# Patient Record
Sex: MALE | Race: WHITE | Employment: FULL TIME | URBAN - METROPOLITAN AREA
[De-identification: names, ages, dates, MRNs, and addresses within clinical notes are randomized per-mention and may not be internally consistent; named-entity substitution may affect disease eponyms.]

---

## 2017-08-23 ENCOUNTER — TELEPHONE (OUTPATIENT)
Dept: INTERNAL MEDICINE CLINIC | Facility: CLINIC | Age: 62
End: 2017-08-23

## 2017-08-23 NOTE — TELEPHONE ENCOUNTER
Patient scheduled physical for 9/29/17  Patient still has labs ordered for a physical from 9/30/16 that he never completed  These labs are good for a year.     TCB----no hipaa info listed

## 2017-09-23 ENCOUNTER — APPOINTMENT (OUTPATIENT)
Dept: LAB | Age: 62
End: 2017-09-23
Attending: INTERNAL MEDICINE
Payer: COMMERCIAL

## 2017-09-23 PROCEDURE — 80061 LIPID PANEL: CPT | Performed by: INTERNAL MEDICINE

## 2017-09-23 PROCEDURE — 84443 ASSAY THYROID STIM HORMONE: CPT | Performed by: INTERNAL MEDICINE

## 2017-09-23 PROCEDURE — 85025 COMPLETE CBC W/AUTO DIFF WBC: CPT | Performed by: INTERNAL MEDICINE

## 2017-09-23 PROCEDURE — 82306 VITAMIN D 25 HYDROXY: CPT | Performed by: INTERNAL MEDICINE

## 2017-09-23 PROCEDURE — 80053 COMPREHEN METABOLIC PANEL: CPT | Performed by: INTERNAL MEDICINE

## 2017-09-23 PROCEDURE — 81003 URINALYSIS AUTO W/O SCOPE: CPT | Performed by: INTERNAL MEDICINE

## 2017-09-29 ENCOUNTER — OFFICE VISIT (OUTPATIENT)
Dept: INTERNAL MEDICINE CLINIC | Facility: CLINIC | Age: 62
End: 2017-09-29

## 2017-09-29 VITALS
TEMPERATURE: 98 F | WEIGHT: 167 LBS | RESPIRATION RATE: 18 BRPM | HEART RATE: 96 BPM | DIASTOLIC BLOOD PRESSURE: 80 MMHG | SYSTOLIC BLOOD PRESSURE: 110 MMHG | OXYGEN SATURATION: 98 % | HEIGHT: 66 IN | BODY MASS INDEX: 26.84 KG/M2

## 2017-09-29 DIAGNOSIS — E78.01 FAMILIAL HYPERCHOLESTEROLEMIA: ICD-10-CM

## 2017-09-29 DIAGNOSIS — F41.9 ANXIETY: ICD-10-CM

## 2017-09-29 DIAGNOSIS — Z12.11 SCREEN FOR COLON CANCER: ICD-10-CM

## 2017-09-29 DIAGNOSIS — Z91.09 ENVIRONMENTAL ALLERGIES: ICD-10-CM

## 2017-09-29 DIAGNOSIS — Z00.00 PHYSICAL EXAM: Primary | ICD-10-CM

## 2017-09-29 PROCEDURE — 99396 PREV VISIT EST AGE 40-64: CPT | Performed by: INTERNAL MEDICINE

## 2017-09-29 RX ORDER — ALPRAZOLAM 0.25 MG/1
0.25 TABLET ORAL EVERY 6 HOURS PRN
Qty: 40 TABLET | Refills: 1 | Status: SHIPPED | OUTPATIENT
Start: 2017-09-29 | End: 2018-07-19

## 2017-09-29 RX ORDER — LORATADINE 10 MG/1
10 TABLET ORAL DAILY
COMMUNITY

## 2017-09-29 RX ORDER — DIPHENHYDRAMINE HCL 25 MG
25 TABLET ORAL NIGHTLY PRN
COMMUNITY

## 2017-09-29 NOTE — PATIENT INSTRUCTIONS
1. Physical exam  Physical exam instruction: Improve diet and exercise, fasting labs completed and discussed and visit,    2. Environmental allergies  Continue as needed medications, try to avoid the stimulant parts, such as Sudafed    3.  Anxiety  Stable,

## 2018-05-16 PROCEDURE — 88305 TISSUE EXAM BY PATHOLOGIST: CPT | Performed by: INTERNAL MEDICINE

## 2018-07-19 DIAGNOSIS — F41.9 ANXIETY: ICD-10-CM

## 2018-07-19 RX ORDER — ALPRAZOLAM 0.25 MG/1
0.25 TABLET ORAL EVERY 6 HOURS PRN
Qty: 40 TABLET | Refills: 1 | Status: SHIPPED
Start: 2018-07-19 | End: 2021-02-12

## 2021-01-18 ENCOUNTER — TELEPHONE (OUTPATIENT)
Dept: INTERNAL MEDICINE CLINIC | Facility: CLINIC | Age: 66
End: 2021-01-18

## 2021-01-18 DIAGNOSIS — Z12.5 SCREENING FOR PROSTATE CANCER: ICD-10-CM

## 2021-01-18 DIAGNOSIS — E55.9 VITAMIN D DEFICIENCY: ICD-10-CM

## 2021-01-18 DIAGNOSIS — Z00.00 ANNUAL PHYSICAL EXAM: Primary | ICD-10-CM

## 2021-01-18 NOTE — TELEPHONE ENCOUNTER
Patient calling for annual labs to be entered. Has physical scheduled with Dr. Victor Hugo Garcia 2/23/2021.   Would like PSA included in orders    Best call back number [phone]

## 2021-01-19 NOTE — TELEPHONE ENCOUNTER
Spoke to patient and relayed MD message, patient verbalized understanding. Patient transferred to [facility] at [address] to schedule lab visit.

## 2021-01-27 NOTE — TELEPHONE ENCOUNTER
Patient is calling today to request a cologuard test be added to his chart. Patient is wondering how the cologuard test works and what exactly the steps are to complete it.       # [phone]  Patient is coming in to see Dr Nessa Escobar on 2/12/2021 at [address] [place] [address].

## 2021-01-27 NOTE — TELEPHONE ENCOUNTER
Called patient and explained how cologuard test works and that he can discuss at time of visit -verbalized understanding

## 2021-02-09 ENCOUNTER — LAB ENCOUNTER (OUTPATIENT)
Dept: LAB | Age: 66
End: 2021-02-09
Attending: INTERNAL MEDICINE
Payer: COMMERCIAL

## 2021-02-09 DIAGNOSIS — Z00.00 ANNUAL PHYSICAL EXAM: ICD-10-CM

## 2021-02-09 LAB
ALBUMIN SERPL-MCNC: 3.6 G/DL (ref 3.4–5)
ALBUMIN/GLOB SERPL: 1 {RATIO} (ref 1–2)
ALP LIVER SERPL-CCNC: 87 U/L
ALT SERPL-CCNC: 36 U/L
ANION GAP SERPL CALC-SCNC: 6 MMOL/L (ref 0–18)
AST SERPL-CCNC: 21 U/L (ref 15–37)
BACTERIA UR QL AUTO: NEGATIVE /HPF
BASOPHILS # BLD AUTO: 0.05 X10(3) UL (ref 0–0.2)
BASOPHILS NFR BLD AUTO: 0.9 %
BILIRUB SERPL-MCNC: 0.6 MG/DL (ref 0.1–2)
BILIRUB UR QL: NEGATIVE
BUN BLD-MCNC: 17 MG/DL (ref 7–18)
BUN/CREAT SERPL: 15.6 (ref 10–20)
CALCIUM BLD-MCNC: 8.8 MG/DL (ref 8.5–10.1)
CHLORIDE SERPL-SCNC: 108 MMOL/L (ref 98–112)
CHOLEST SMN-MCNC: 238 MG/DL (ref ?–200)
CLARITY UR: CLEAR
CO2 SERPL-SCNC: 27 MMOL/L (ref 21–32)
COLOR UR: YELLOW
COMPLEXED PSA SERPL-MCNC: 0.71 NG/ML (ref ?–4)
CREAT BLD-MCNC: 1.09 MG/DL
DEPRECATED RDW RBC AUTO: 38.5 FL (ref 35.1–46.3)
EOSINOPHIL # BLD AUTO: 0.17 X10(3) UL (ref 0–0.7)
EOSINOPHIL NFR BLD AUTO: 3.2 %
ERYTHROCYTE [DISTWIDTH] IN BLOOD BY AUTOMATED COUNT: 11.6 % (ref 11–15)
GLOBULIN PLAS-MCNC: 3.7 G/DL (ref 2.8–4.4)
GLUCOSE BLD-MCNC: 107 MG/DL (ref 70–99)
GLUCOSE UR-MCNC: NEGATIVE MG/DL
HCT VFR BLD AUTO: 45.4 %
HDLC SERPL-MCNC: 49 MG/DL (ref 40–59)
HGB BLD-MCNC: 15.8 G/DL
HGB UR QL STRIP.AUTO: NEGATIVE
IMM GRANULOCYTES # BLD AUTO: 0.01 X10(3) UL (ref 0–1)
IMM GRANULOCYTES NFR BLD: 0.2 %
KETONES UR-MCNC: NEGATIVE MG/DL
LDLC SERPL CALC-MCNC: 169 MG/DL (ref ?–100)
LEUKOCYTE ESTERASE UR QL STRIP.AUTO: NEGATIVE
LYMPHOCYTES # BLD AUTO: 1.62 X10(3) UL (ref 1–4)
LYMPHOCYTES NFR BLD AUTO: 30.7 %
M PROTEIN MFR SERPL ELPH: 7.3 G/DL (ref 6.4–8.2)
MCH RBC QN AUTO: 31.8 PG (ref 26–34)
MCHC RBC AUTO-ENTMCNC: 34.8 G/DL (ref 31–37)
MCV RBC AUTO: 91.3 FL
MONOCYTES # BLD AUTO: 0.6 X10(3) UL (ref 0.1–1)
MONOCYTES NFR BLD AUTO: 11.4 %
NEUTROPHILS # BLD AUTO: 2.82 X10 (3) UL (ref 1.5–7.7)
NEUTROPHILS # BLD AUTO: 2.82 X10(3) UL (ref 1.5–7.7)
NEUTROPHILS NFR BLD AUTO: 53.6 %
NITRITE UR QL STRIP.AUTO: NEGATIVE
NONHDLC SERPL-MCNC: 189 MG/DL (ref ?–130)
OSMOLALITY SERPL CALC.SUM OF ELEC: 294 MOSM/KG (ref 275–295)
PATIENT FASTING Y/N/NP: YES
PATIENT FASTING Y/N/NP: YES
PH UR: 6 [PH] (ref 5–8)
PLATELET # BLD AUTO: 327 10(3)UL (ref 150–450)
POTASSIUM SERPL-SCNC: 4.1 MMOL/L (ref 3.5–5.1)
PROT UR-MCNC: NEGATIVE MG/DL
RBC # BLD AUTO: 4.97 X10(6)UL
RBC #/AREA URNS AUTO: 0 /HPF
SODIUM SERPL-SCNC: 141 MMOL/L (ref 136–145)
SP GR UR STRIP: 1.01 (ref 1–1.03)
T4 FREE SERPL-MCNC: 1.1 NG/DL (ref 0.8–1.7)
TRIGL SERPL-MCNC: 98 MG/DL (ref 30–149)
TSI SER-ACNC: 3.8 MIU/ML (ref 0.36–3.74)
UROBILINOGEN UR STRIP-ACNC: <2
VLDLC SERPL CALC-MCNC: 20 MG/DL (ref 0–30)
WBC # BLD AUTO: 5.3 X10(3) UL (ref 4–11)
WBC #/AREA URNS AUTO: 1 /HPF

## 2021-02-09 PROCEDURE — 80061 LIPID PANEL: CPT | Performed by: INTERNAL MEDICINE

## 2021-02-09 PROCEDURE — 84403 ASSAY OF TOTAL TESTOSTERONE: CPT

## 2021-02-09 PROCEDURE — 84443 ASSAY THYROID STIM HORMONE: CPT | Performed by: INTERNAL MEDICINE

## 2021-02-09 PROCEDURE — 81001 URINALYSIS AUTO W/SCOPE: CPT | Performed by: INTERNAL MEDICINE

## 2021-02-09 PROCEDURE — 80053 COMPREHEN METABOLIC PANEL: CPT | Performed by: INTERNAL MEDICINE

## 2021-02-09 PROCEDURE — 36415 COLL VENOUS BLD VENIPUNCTURE: CPT | Performed by: INTERNAL MEDICINE

## 2021-02-09 PROCEDURE — 85025 COMPLETE CBC W/AUTO DIFF WBC: CPT | Performed by: INTERNAL MEDICINE

## 2021-02-09 PROCEDURE — 84402 ASSAY OF FREE TESTOSTERONE: CPT

## 2021-02-09 PROCEDURE — 84439 ASSAY OF FREE THYROXINE: CPT | Performed by: INTERNAL MEDICINE

## 2021-02-09 PROCEDURE — 82306 VITAMIN D 25 HYDROXY: CPT | Performed by: INTERNAL MEDICINE

## 2021-02-10 LAB — 25(OH)D3 SERPL-MCNC: 46.8 NG/ML (ref 30–100)

## 2021-02-11 LAB
TESTOSTERONE, FREE, S: 11.6 NG/DL
TESTOSTERONE, TOTAL, S: 463 NG/DL

## 2021-02-12 ENCOUNTER — OFFICE VISIT (OUTPATIENT)
Dept: INTERNAL MEDICINE CLINIC | Facility: CLINIC | Age: 66
End: 2021-02-12
Payer: COMMERCIAL

## 2021-02-12 VITALS
DIASTOLIC BLOOD PRESSURE: 78 MMHG | HEIGHT: 66 IN | BODY MASS INDEX: 26.2 KG/M2 | WEIGHT: 163 LBS | OXYGEN SATURATION: 98 % | HEART RATE: 89 BPM | SYSTOLIC BLOOD PRESSURE: 112 MMHG | TEMPERATURE: 98 F

## 2021-02-12 DIAGNOSIS — E78.00 PURE HYPERCHOLESTEROLEMIA: ICD-10-CM

## 2021-02-12 DIAGNOSIS — Z00.00 PHYSICAL EXAM: Primary | ICD-10-CM

## 2021-02-12 DIAGNOSIS — Z86.79 HISTORY OF CARDIAC MURMUR: ICD-10-CM

## 2021-02-12 DIAGNOSIS — F41.9 ANXIETY: ICD-10-CM

## 2021-02-12 PROCEDURE — 3078F DIAST BP <80 MM HG: CPT | Performed by: INTERNAL MEDICINE

## 2021-02-12 PROCEDURE — 3074F SYST BP LT 130 MM HG: CPT | Performed by: INTERNAL MEDICINE

## 2021-02-12 PROCEDURE — 3008F BODY MASS INDEX DOCD: CPT | Performed by: INTERNAL MEDICINE

## 2021-02-12 PROCEDURE — 99387 INIT PM E/M NEW PAT 65+ YRS: CPT | Performed by: INTERNAL MEDICINE

## 2021-02-12 PROCEDURE — 93000 ELECTROCARDIOGRAM COMPLETE: CPT | Performed by: INTERNAL MEDICINE

## 2021-02-12 RX ORDER — ALPRAZOLAM 0.25 MG/1
0.25 TABLET ORAL EVERY 6 HOURS PRN
Qty: 40 TABLET | Refills: 1 | Status: SHIPPED | OUTPATIENT
Start: 2021-02-12 | End: 2021-06-25

## 2021-02-12 NOTE — PROGRESS NOTES
Geoff Mendes is a 77year old male who presents for a complete physical exam.   HPI:   Pt complains of:    Patient presents with:  Physical: pt here for annual exam, diet is lean and stable, gluten free. exercise 2 times per week.  45 min of hr at 65%  Hy depression. Integumentary: Negative for Hives and rash. MS: Negative muscle weakness. negative for joint pain  Hema/Lymph: Negative Easy bleeding and easy bruising. Allergic/Immuno: Negative Environmental allergies and food allergies.     EXAM:   / we discussed, replace protein, we discussed Isopure after the workouts.   Lets get the calcium scoring test done, call the central scheduling department try to set this up I do recommend we get it done in the next few weeks, lets recheck the cholesterol cou

## 2021-02-12 NOTE — PATIENT INSTRUCTIONS
1. Physical exam  Physical exam instruction: Improve diet and exercise, complete fasting labs in the near future and you will be called with results 5-7 days after completed, call with questions.   Call the central scheduling number at [phone] to sched

## 2021-06-23 ENCOUNTER — TELEPHONE (OUTPATIENT)
Dept: INTERNAL MEDICINE CLINIC | Facility: CLINIC | Age: 66
End: 2021-06-23

## 2021-06-23 DIAGNOSIS — F41.9 ANXIETY: ICD-10-CM

## 2021-06-24 NOTE — TELEPHONE ENCOUNTER
To MD:  The above refill request is for a controlled substance. Please review pended medication order. Print and sign for staff to fax to pharmacy or prescribe electronically.     Last office visit: 2/12/2021  Last time refill sent and quantity/refills:

## 2021-06-25 RX ORDER — ALPRAZOLAM 0.25 MG/1
0.25 TABLET ORAL EVERY 6 HOURS PRN
Qty: 40 TABLET | Refills: 1 | Status: SHIPPED | OUTPATIENT
Start: 2021-06-25 | End: 2021-08-27

## 2021-08-21 ENCOUNTER — LAB ENCOUNTER (OUTPATIENT)
Dept: LAB | Age: 66
End: 2021-08-21
Attending: INTERNAL MEDICINE
Payer: COMMERCIAL

## 2021-08-21 DIAGNOSIS — Z00.00 PHYSICAL EXAM: ICD-10-CM

## 2021-08-21 LAB
ALBUMIN SERPL-MCNC: 3.6 G/DL (ref 3.4–5)
ALBUMIN/GLOB SERPL: 1.1 {RATIO} (ref 1–2)
ALP LIVER SERPL-CCNC: 98 U/L
ALT SERPL-CCNC: 33 U/L
ANION GAP SERPL CALC-SCNC: 5 MMOL/L (ref 0–18)
AST SERPL-CCNC: 25 U/L (ref 15–37)
BILIRUB SERPL-MCNC: 0.5 MG/DL (ref 0.1–2)
BUN BLD-MCNC: 18 MG/DL (ref 7–18)
BUN/CREAT SERPL: 20 (ref 10–20)
CALCIUM BLD-MCNC: 8.4 MG/DL (ref 8.5–10.1)
CHLORIDE SERPL-SCNC: 110 MMOL/L (ref 98–112)
CHOLEST SMN-MCNC: 187 MG/DL (ref ?–200)
CO2 SERPL-SCNC: 25 MMOL/L (ref 21–32)
CREAT BLD-MCNC: 0.9 MG/DL
GLOBULIN PLAS-MCNC: 3.4 G/DL (ref 2.8–4.4)
GLUCOSE BLD-MCNC: 103 MG/DL (ref 70–99)
HDLC SERPL-MCNC: 40 MG/DL (ref 40–59)
LDLC SERPL CALC-MCNC: 130 MG/DL (ref ?–100)
M PROTEIN MFR SERPL ELPH: 7 G/DL (ref 6.4–8.2)
NONHDLC SERPL-MCNC: 147 MG/DL (ref ?–130)
OSMOLALITY SERPL CALC.SUM OF ELEC: 292 MOSM/KG (ref 275–295)
PATIENT FASTING Y/N/NP: YES
PATIENT FASTING Y/N/NP: YES
POTASSIUM SERPL-SCNC: 3.8 MMOL/L (ref 3.5–5.1)
SODIUM SERPL-SCNC: 140 MMOL/L (ref 136–145)
TRIGL SERPL-MCNC: 93 MG/DL (ref 30–149)
VLDLC SERPL CALC-MCNC: 17 MG/DL (ref 0–30)

## 2021-08-21 PROCEDURE — 80053 COMPREHEN METABOLIC PANEL: CPT

## 2021-08-21 PROCEDURE — 36415 COLL VENOUS BLD VENIPUNCTURE: CPT

## 2021-08-21 PROCEDURE — 80061 LIPID PANEL: CPT

## 2021-08-27 ENCOUNTER — OFFICE VISIT (OUTPATIENT)
Dept: INTERNAL MEDICINE CLINIC | Facility: CLINIC | Age: 66
End: 2021-08-27
Payer: COMMERCIAL

## 2021-08-27 VITALS
HEIGHT: 66 IN | DIASTOLIC BLOOD PRESSURE: 60 MMHG | OXYGEN SATURATION: 98 % | HEART RATE: 82 BPM | SYSTOLIC BLOOD PRESSURE: 124 MMHG | TEMPERATURE: 98 F | BODY MASS INDEX: 26.2 KG/M2 | WEIGHT: 163 LBS

## 2021-08-27 DIAGNOSIS — E78.00 PURE HYPERCHOLESTEROLEMIA: Primary | ICD-10-CM

## 2021-08-27 DIAGNOSIS — F41.9 ANXIETY: ICD-10-CM

## 2021-08-27 PROCEDURE — 3008F BODY MASS INDEX DOCD: CPT | Performed by: INTERNAL MEDICINE

## 2021-08-27 PROCEDURE — 99214 OFFICE O/P EST MOD 30 MIN: CPT | Performed by: INTERNAL MEDICINE

## 2021-08-27 PROCEDURE — 3078F DIAST BP <80 MM HG: CPT | Performed by: INTERNAL MEDICINE

## 2021-08-27 PROCEDURE — 3074F SYST BP LT 130 MM HG: CPT | Performed by: INTERNAL MEDICINE

## 2021-08-27 RX ORDER — ALPRAZOLAM 0.25 MG/1
0.25 TABLET ORAL EVERY 6 HOURS PRN
Qty: 40 TABLET | Refills: 1 | Status: SHIPPED | OUTPATIENT
Start: 2021-08-27 | End: 2021-10-05

## 2021-08-27 NOTE — PROGRESS NOTES
HPI:   Malik Levi is a 74year old male who presents for complains of: Patient presents with:   Follow - Up: Pt here for cholesterol f/u, much improved with diet and exrcise, fish oil  Anxiety: stressed with daughter with daughter with new diagnosis of b mg total) by mouth every 6 (six) hours as needed for Anxiety. Dispense: 40 tablet;  Refill: 1  - BH NAVIGATOR        Spent 30 minutes obtaining history, evaluating patient, discussing treatment options, diet, exercise, review of available labs and radiolog

## 2021-08-27 NOTE — PATIENT INSTRUCTIONS
1. Pure hypercholesterolemia  Stable, keep up the good work, nice job reducing levels, lets continue to observe this, then in 6 to 12 months lets repeat your fasting lab work with a visit here for your regular physical,  I like the idea of you changing up

## 2021-10-04 ENCOUNTER — TELEPHONE (OUTPATIENT)
Dept: INTERNAL MEDICINE CLINIC | Facility: CLINIC | Age: 66
End: 2021-10-04

## 2021-10-04 DIAGNOSIS — F41.9 ANXIETY: ICD-10-CM

## 2021-10-04 NOTE — TELEPHONE ENCOUNTER
Pt. Is calling to request a refill on Alprazolam pt. Is out of meds   Ph. # [phone]  [facility].  # [phone]  Routed low to Rx

## 2021-10-05 RX ORDER — ALPRAZOLAM 0.25 MG/1
0.25 TABLET ORAL EVERY 6 HOURS PRN
Qty: 40 TABLET | Refills: 1 | Status: SHIPPED | OUTPATIENT
Start: 2021-10-05

## 2021-10-05 NOTE — TELEPHONE ENCOUNTER
To MD:  The above refill request is for a controlled substance. Please review pended medication order. Print and sign for staff to fax to pharmacy or prescribe electronically.     Last office visit: 8/27/21  Last time refill sent and quantity/refills: 8/

## 2022-02-09 ENCOUNTER — TELEPHONE (OUTPATIENT)
Dept: INTERNAL MEDICINE CLINIC | Facility: CLINIC | Age: 67
End: 2022-02-09

## 2022-02-10 RX ORDER — ALPRAZOLAM 0.25 MG/1
0.25 TABLET ORAL EVERY 6 HOURS PRN
Qty: 40 TABLET | Refills: 1 | Status: SHIPPED | OUTPATIENT
Start: 2022-02-10

## 2022-04-08 ENCOUNTER — HOSPITAL ENCOUNTER (OUTPATIENT)
Dept: REHABILITATION | Age: 67
Discharge: STILL ADMITTED OR ADMINISTRATIVE | End: 2022-04-08

## 2022-04-08 DIAGNOSIS — M25.511 RIGHT SHOULDER PAIN: Primary | ICD-10-CM

## 2022-04-08 PROCEDURE — 97110 THERAPEUTIC EXERCISES: CPT | Performed by: PHYSICAL THERAPIST

## 2022-04-08 PROCEDURE — 97161 PT EVAL LOW COMPLEX 20 MIN: CPT | Performed by: PHYSICAL THERAPIST

## 2022-04-08 PROCEDURE — 97140 MANUAL THERAPY 1/> REGIONS: CPT | Performed by: PHYSICAL THERAPIST

## 2022-04-23 ENCOUNTER — HOSPITAL ENCOUNTER (OUTPATIENT)
Dept: REHABILITATION | Age: 67
Discharge: STILL ADMITTED OR ADMINISTRATIVE | End: 2022-04-23

## 2022-04-23 PROCEDURE — 97140 MANUAL THERAPY 1/> REGIONS: CPT | Performed by: PHYSICAL THERAPIST

## 2022-04-23 PROCEDURE — 97110 THERAPEUTIC EXERCISES: CPT | Performed by: PHYSICAL THERAPIST

## 2022-04-29 ENCOUNTER — HOSPITAL ENCOUNTER (OUTPATIENT)
Dept: REHABILITATION | Age: 67
Discharge: STILL ADMITTED OR ADMINISTRATIVE | End: 2022-04-29

## 2022-04-29 PROCEDURE — 97140 MANUAL THERAPY 1/> REGIONS: CPT | Performed by: PHYSICAL THERAPIST

## 2022-04-29 PROCEDURE — 97110 THERAPEUTIC EXERCISES: CPT | Performed by: PHYSICAL THERAPIST

## 2022-05-04 ENCOUNTER — TELEPHONE (OUTPATIENT)
Dept: INTERNAL MEDICINE CLINIC | Facility: CLINIC | Age: 67
End: 2022-05-04

## 2022-05-05 RX ORDER — ALPRAZOLAM 0.25 MG/1
0.25 TABLET ORAL EVERY 6 HOURS PRN
Qty: 40 TABLET | Refills: 1 | Status: SHIPPED | OUTPATIENT
Start: 2022-05-05

## 2022-05-05 NOTE — TELEPHONE ENCOUNTER
To MD:  The above refill request is for a controlled substance. Please review pended medication order. Print and sign for staff to fax to pharmacy or prescribe electronically.     Last office visit: 8/27/2021  Last time refill sent and quantity/refills: 2/10/2022 #40 with 1 refill

## 2022-05-07 ENCOUNTER — HOSPITAL ENCOUNTER (OUTPATIENT)
Dept: REHABILITATION | Age: 67
Discharge: STILL ADMITTED OR ADMINISTRATIVE | End: 2022-05-07

## 2022-05-07 PROCEDURE — 97140 MANUAL THERAPY 1/> REGIONS: CPT | Performed by: PHYSICAL THERAPIST

## 2022-05-07 PROCEDURE — 97110 THERAPEUTIC EXERCISES: CPT | Performed by: PHYSICAL THERAPIST

## 2022-05-10 ENCOUNTER — HOSPITAL ENCOUNTER (OUTPATIENT)
Dept: REHABILITATION | Age: 67
Discharge: STILL ADMITTED OR ADMINISTRATIVE | End: 2022-05-10

## 2022-05-10 PROCEDURE — 97110 THERAPEUTIC EXERCISES: CPT | Performed by: PHYSICAL THERAPIST

## 2022-05-10 PROCEDURE — 97140 MANUAL THERAPY 1/> REGIONS: CPT | Performed by: PHYSICAL THERAPIST

## 2022-05-17 ENCOUNTER — HOSPITAL ENCOUNTER (OUTPATIENT)
Dept: REHABILITATION | Age: 67
Discharge: STILL ADMITTED OR ADMINISTRATIVE | End: 2022-05-17

## 2022-05-17 PROCEDURE — 97140 MANUAL THERAPY 1/> REGIONS: CPT | Performed by: PHYSICAL THERAPIST

## 2022-05-17 PROCEDURE — 97110 THERAPEUTIC EXERCISES: CPT | Performed by: PHYSICAL THERAPIST

## 2022-05-27 ENCOUNTER — HOSPITAL ENCOUNTER (OUTPATIENT)
Dept: REHABILITATION | Age: 67
Discharge: STILL ADMITTED OR ADMINISTRATIVE | End: 2022-05-27

## 2022-05-27 PROCEDURE — 97140 MANUAL THERAPY 1/> REGIONS: CPT | Performed by: PHYSICAL THERAPIST

## 2022-05-27 PROCEDURE — 97110 THERAPEUTIC EXERCISES: CPT | Performed by: PHYSICAL THERAPIST

## 2022-08-29 ENCOUNTER — TELEPHONE (OUTPATIENT)
Dept: INTERNAL MEDICINE CLINIC | Facility: CLINIC | Age: 67
End: 2022-08-29

## 2022-08-29 DIAGNOSIS — E78.00 PURE HYPERCHOLESTEROLEMIA: ICD-10-CM

## 2022-08-29 DIAGNOSIS — Z00.00 PHYSICAL EXAM: ICD-10-CM

## 2022-08-29 DIAGNOSIS — F41.9 ANXIETY: ICD-10-CM

## 2022-08-29 DIAGNOSIS — Z12.5 SCREENING FOR PROSTATE CANCER: Primary | ICD-10-CM

## 2022-08-29 RX ORDER — ALPRAZOLAM 0.25 MG/1
0.25 TABLET ORAL EVERY 6 HOURS PRN
Qty: 40 TABLET | Refills: 0 | Status: SHIPPED | OUTPATIENT
Start: 2022-08-29

## 2022-08-29 NOTE — TELEPHONE ENCOUNTER
Pt returning office call and scheduled his Medicare Annual of 9/27/22 @ 9am in order to get his refill.

## 2022-08-29 NOTE — TELEPHONE ENCOUNTER
Pt is calling and would like orders put in for labs to be done prior to his 9/26/22 medicare annual.    Pt is requesting PSA to be added to the lab orders.

## 2022-08-29 NOTE — TELEPHONE ENCOUNTER
To  to please call patient to schedule annual and then route back to rx. Patient last seen for follow up 8/27/21, last physical 2/12/21. Thanks!

## 2022-08-29 NOTE — TELEPHONE ENCOUNTER
To Dr. Marcella Michel    The above refill request is for a controlled substance. Please review pended medication order. lov 8/27/21.  Has appt scheduled 9/27/22  Prescribed #40 with 1 5/5/22   #40 5/5/22

## 2022-11-19 ENCOUNTER — LAB ENCOUNTER (OUTPATIENT)
Dept: LAB | Age: 67
End: 2022-11-19
Attending: INTERNAL MEDICINE
Payer: MEDICARE

## 2022-11-19 DIAGNOSIS — E78.00 PURE HYPERCHOLESTEROLEMIA: ICD-10-CM

## 2022-11-19 DIAGNOSIS — Z00.00 PHYSICAL EXAM: ICD-10-CM

## 2022-11-19 DIAGNOSIS — Z12.5 SCREENING FOR PROSTATE CANCER: ICD-10-CM

## 2022-11-19 LAB
ALBUMIN SERPL-MCNC: 3.4 G/DL (ref 3.4–5)
ALBUMIN/GLOB SERPL: 1.1 {RATIO} (ref 1–2)
ALP LIVER SERPL-CCNC: 88 U/L
ALT SERPL-CCNC: 25 U/L
ANION GAP SERPL CALC-SCNC: 6 MMOL/L (ref 0–18)
AST SERPL-CCNC: 14 U/L (ref 15–37)
BASOPHILS # BLD AUTO: 0.04 X10(3) UL (ref 0–0.2)
BASOPHILS NFR BLD AUTO: 0.8 %
BILIRUB SERPL-MCNC: 0.9 MG/DL (ref 0.1–2)
BILIRUB UR QL: NEGATIVE
BUN BLD-MCNC: 9 MG/DL (ref 7–18)
BUN/CREAT SERPL: 11.3 (ref 10–20)
CALCIUM BLD-MCNC: 9 MG/DL (ref 8.5–10.1)
CHLORIDE SERPL-SCNC: 106 MMOL/L (ref 98–112)
CHOLEST SERPL-MCNC: 196 MG/DL (ref ?–200)
CLARITY UR: CLEAR
CO2 SERPL-SCNC: 28 MMOL/L (ref 21–32)
COLOR UR: YELLOW
COMPLEXED PSA SERPL-MCNC: 0.85 NG/ML (ref ?–4)
CREAT BLD-MCNC: 0.8 MG/DL
DEPRECATED RDW RBC AUTO: 40.9 FL (ref 35.1–46.3)
EOSINOPHIL # BLD AUTO: 0.15 X10(3) UL (ref 0–0.7)
EOSINOPHIL NFR BLD AUTO: 3.1 %
ERYTHROCYTE [DISTWIDTH] IN BLOOD BY AUTOMATED COUNT: 11.9 % (ref 11–15)
FASTING PATIENT LIPID ANSWER: YES
FASTING STATUS PATIENT QL REPORTED: YES
GFR SERPLBLD BASED ON 1.73 SQ M-ARVRAT: 96 ML/MIN/1.73M2 (ref 60–?)
GLOBULIN PLAS-MCNC: 3.2 G/DL (ref 2.8–4.4)
GLUCOSE BLD-MCNC: 102 MG/DL (ref 70–99)
GLUCOSE UR-MCNC: NEGATIVE MG/DL
HCT VFR BLD AUTO: 47 %
HDLC SERPL-MCNC: 51 MG/DL (ref 40–59)
HGB BLD-MCNC: 16 G/DL
HGB UR QL STRIP.AUTO: NEGATIVE
IMM GRANULOCYTES # BLD AUTO: 0.01 X10(3) UL (ref 0–1)
IMM GRANULOCYTES NFR BLD: 0.2 %
KETONES UR-MCNC: NEGATIVE MG/DL
LDLC SERPL CALC-MCNC: 127 MG/DL (ref ?–100)
LEUKOCYTE ESTERASE UR QL STRIP.AUTO: NEGATIVE
LYMPHOCYTES # BLD AUTO: 2.01 X10(3) UL (ref 1–4)
LYMPHOCYTES NFR BLD AUTO: 41.1 %
MCH RBC QN AUTO: 32 PG (ref 26–34)
MCHC RBC AUTO-ENTMCNC: 34 G/DL (ref 31–37)
MCV RBC AUTO: 94 FL
MONOCYTES # BLD AUTO: 0.56 X10(3) UL (ref 0.1–1)
MONOCYTES NFR BLD AUTO: 11.5 %
NEUTROPHILS # BLD AUTO: 2.12 X10 (3) UL (ref 1.5–7.7)
NEUTROPHILS # BLD AUTO: 2.12 X10(3) UL (ref 1.5–7.7)
NEUTROPHILS NFR BLD AUTO: 43.3 %
NITRITE UR QL STRIP.AUTO: NEGATIVE
NONHDLC SERPL-MCNC: 145 MG/DL (ref ?–130)
OSMOLALITY SERPL CALC.SUM OF ELEC: 289 MOSM/KG (ref 275–295)
PH UR: 6 [PH] (ref 5–8)
PLATELET # BLD AUTO: 340 10(3)UL (ref 150–450)
POTASSIUM SERPL-SCNC: 4.1 MMOL/L (ref 3.5–5.1)
PROT SERPL-MCNC: 6.6 G/DL (ref 6.4–8.2)
PROT UR-MCNC: NEGATIVE MG/DL
RBC # BLD AUTO: 5 X10(6)UL
SODIUM SERPL-SCNC: 140 MMOL/L (ref 136–145)
SP GR UR STRIP: 1.01 (ref 1–1.03)
TRIGL SERPL-MCNC: 99 MG/DL (ref 30–149)
TSI SER-ACNC: 2.04 MIU/ML (ref 0.36–3.74)
UROBILINOGEN UR STRIP-ACNC: <2
VIT C UR-MCNC: NEGATIVE MG/DL
VLDLC SERPL CALC-MCNC: 18 MG/DL (ref 0–30)
WBC # BLD AUTO: 4.9 X10(3) UL (ref 4–11)

## 2022-11-19 PROCEDURE — 84443 ASSAY THYROID STIM HORMONE: CPT

## 2022-11-19 PROCEDURE — 81003 URINALYSIS AUTO W/O SCOPE: CPT | Performed by: INTERNAL MEDICINE

## 2022-11-19 PROCEDURE — 85025 COMPLETE CBC W/AUTO DIFF WBC: CPT

## 2022-11-19 PROCEDURE — 36415 COLL VENOUS BLD VENIPUNCTURE: CPT

## 2022-11-19 PROCEDURE — 80053 COMPREHEN METABOLIC PANEL: CPT

## 2022-11-19 PROCEDURE — 80061 LIPID PANEL: CPT

## 2022-11-22 ENCOUNTER — OFFICE VISIT (OUTPATIENT)
Dept: INTERNAL MEDICINE CLINIC | Facility: CLINIC | Age: 67
End: 2022-11-22
Payer: MEDICARE

## 2022-11-22 VITALS
HEART RATE: 88 BPM | BODY MASS INDEX: 26.01 KG/M2 | TEMPERATURE: 98 F | DIASTOLIC BLOOD PRESSURE: 84 MMHG | HEIGHT: 66 IN | WEIGHT: 161.81 LBS | SYSTOLIC BLOOD PRESSURE: 118 MMHG | OXYGEN SATURATION: 96 %

## 2022-11-22 DIAGNOSIS — Z00.00 ENCOUNTER FOR ANNUAL HEALTH EXAMINATION: ICD-10-CM

## 2022-11-22 DIAGNOSIS — H91.13 PRESBYCUSIS OF BOTH EARS: ICD-10-CM

## 2022-11-22 DIAGNOSIS — Z91.09 ENVIRONMENTAL ALLERGIES: ICD-10-CM

## 2022-11-22 DIAGNOSIS — E78.01 FAMILIAL HYPERCHOLESTEROLEMIA: ICD-10-CM

## 2022-11-22 DIAGNOSIS — Z00.00 ANNUAL PHYSICAL EXAM: Primary | ICD-10-CM

## 2022-11-22 DIAGNOSIS — F41.9 ANXIETY: ICD-10-CM

## 2022-11-22 DIAGNOSIS — Z23 NEED FOR VACCINATION: ICD-10-CM

## 2022-11-22 DIAGNOSIS — M24.549 CONTRACTURE OF HAND: ICD-10-CM

## 2022-11-22 RX ORDER — ALPRAZOLAM 0.25 MG/1
0.25 TABLET ORAL EVERY 6 HOURS PRN
Qty: 40 TABLET | Refills: 1 | Status: SHIPPED | OUTPATIENT
Start: 2022-11-22

## 2022-11-22 RX ORDER — ESCITALOPRAM OXALATE 10 MG/1
10 TABLET ORAL DAILY
Qty: 30 TABLET | Refills: 5 | Status: SHIPPED | OUTPATIENT
Start: 2022-11-22

## 2022-11-22 NOTE — PATIENT INSTRUCTIONS
1. Annual physical exam  Physical exam instruction: Improve diet and exercise  -I like the idea of the Prevnar 20/pneumonia shot in 2 weeks at the earliest if not the next time you are in the office here. - ELECTROCARDIOGRAM, COMPLETE: Sinus rhythm at 85 bpm no ST-T wave changes, normal EKG. 2. Familial hypercholesterolemia  Stable continue current monitoring management here, exercise    3. Anxiety  Stable continue current monitor management I do like the idea of going on some small dose Lexapro here, and talking to a counselor, letting me know 2 to 4 weeks if we have some effectiveness here, and staying on this for most of the winter months, possibly weaning off in the springtime or summer but with my guidance, alprazolam refilled as well  - escitalopram 10 MG Oral Tab; Take 1 tablet (10 mg total) by mouth daily. Dispense: 30 tablet; Refill: 5  - ALPRAZolam 0.25 MG Oral Tab; Take 1 tablet (0.25 mg total) by mouth every 6 (six) hours as needed for Anxiety. Dispense: 40 tablet; Refill: 1  -  NAVIGATOR    4. Environmental allergies  Stable continue current monitoring management    5. Encounter for annual health examination  Stable continue current monitoring management    6. Need for vaccination  Tdap today  - TETANUS, DIPHTHERIA TOXOIDS AND ACELLULAR PERTUSIS VACCINE (TDAP), >7 YEARS, IM USE    7.  Presbycusis of both ears  Lets get a visit with a hearing specialist  - OP REFERRAL TO AUDIOLOGY    -I like the idea of the Cologuard versus colonoscopy in 2023 at the latest

## 2022-12-19 LAB — AMB EXT COVID-19 RESULT: DETECTED

## 2022-12-20 ENCOUNTER — TELEPHONE (OUTPATIENT)
Dept: INTERNAL MEDICINE CLINIC | Facility: CLINIC | Age: 67
End: 2022-12-20

## 2022-12-20 ENCOUNTER — HOSPITAL ENCOUNTER (OUTPATIENT)
Age: 67
Discharge: HOME | End: 2022-12-20
Payer: MEDICARE

## 2022-12-20 VITALS
HEART RATE: 102 BPM | DIASTOLIC BLOOD PRESSURE: 73 MMHG | SYSTOLIC BLOOD PRESSURE: 130 MMHG | TEMPERATURE: 100 F | BODY MASS INDEX: 25.71 KG/M2 | OXYGEN SATURATION: 96 % | RESPIRATION RATE: 18 BRPM | HEIGHT: 66 IN | WEIGHT: 160 LBS

## 2022-12-20 DIAGNOSIS — U07.1 COVID-19: Primary | ICD-10-CM

## 2022-12-20 LAB — S PYO AG THROAT QL: NEGATIVE

## 2022-12-20 PROCEDURE — 87880 STREP A ASSAY W/OPTIC: CPT | Performed by: NURSE PRACTITIONER

## 2022-12-20 PROCEDURE — 99203 OFFICE O/P NEW LOW 30 MIN: CPT | Performed by: NURSE PRACTITIONER

## 2022-12-20 RX ORDER — NIRMATRELVIR AND RITONAVIR 300-100 MG
KIT ORAL
Qty: 30 TABLET | Refills: 0 | Status: SHIPPED | OUTPATIENT
Start: 2022-12-20 | End: 2022-12-25

## 2022-12-20 NOTE — TELEPHONE ENCOUNTER
I did speak with patient, and recommend he goes on the paxlovid that was sent in by the immediate care yesterday, it does look like his been transmitted properly to his pharmacy, but if it has not, he was instructed to call back and we would send it in for him here.   He does not need renal dosing, was instructed to cut the Lexapro in half for the 5 days he is on the paxlovid then back up to full dose after that he verbalized understanding will follow up Thursday Friday if something is not going in the right direction he verbalized understand

## 2022-12-20 NOTE — TELEPHONE ENCOUNTER
Respiratory infection triage:    Fever:  []  No fever  [x]  Fever>100.4 101  Cough:  [] Tight cough  [] Cough with exertion  [] Dry cough  [x] Sputum production, Color: yellowish  Breathing:  [] Mild shortness of breath interfering with activity  [x] Wheezing  [] Pain with deep breathing  [] Using inhaler    Other symptoms:  [x] Sore throat  [x] Difficulty swallowing  [] Nasal drainage/congestion  [x] Sinus congestion/pressure  [] Ear pain  [x] Body aches  [x] Poor appetite  [] Loss of sense of smell   [] Loss of sense of taste  []Conjunctivitis? [] Any recent travel? [] Any sick contacts? [] Are you a healthcare worker? ADDITIONAL NOTES:  Please advise - called patient who tested positive for covid last night was in [facility] this morning - woul dbe using [facility] [place] (  told him to call pCP in regards to paxlovid  RN went over quarantine guidelines per CDC and to monitor SX like high fever , SOB chest pain or oxygen below 90% which would warrant Er visit  To . NALDO      Notified patient that we will route this message to the doctor and see what their recommendations would be. In the meantime, if anything worsens, they were advised to call back or seek emergent evaluation.

## 2022-12-20 NOTE — DISCHARGE INSTRUCTIONS
Please push fluids and make sure you are staying hydrated. Salt water gargles and tea with honey may help soothe throat. Close follow-up with your primary care provider as recommended.   Any worsening symptoms please go to the ER

## 2022-12-20 NOTE — TELEPHONE ENCOUNTER
Pt. Called stating on Monday he developed stuffy, head, sore throat, body aches, and mild fever. He went to the  [facility] this morning and was diagnosed with Covid. He asked them about starting on Paxlovid. He was directed to call here and discuss with  His primary. Pt. Can be reached at [phone].

## 2023-03-21 ENCOUNTER — TELEPHONE (OUTPATIENT)
Dept: INTERNAL MEDICINE CLINIC | Facility: CLINIC | Age: 68
End: 2023-03-21

## 2023-03-21 DIAGNOSIS — F41.9 ANXIETY: ICD-10-CM

## 2023-03-21 RX ORDER — ALPRAZOLAM 0.25 MG/1
0.25 TABLET ORAL EVERY 6 HOURS PRN
Qty: 40 TABLET | Refills: 1 | Status: CANCELLED | OUTPATIENT
Start: 2023-03-21

## 2023-03-21 NOTE — TELEPHONE ENCOUNTER
[facility] faxing request for alternative Alprazolam 0.25 mg    Medication is on backorder  If permissible please order Alprazolam 0.5 mg

## 2023-03-22 NOTE — TELEPHONE ENCOUNTER
To MD:  The above refill request is for a controlled substance. Please review pended medication order. Print and sign for staff to fax to pharmacy or prescribe electronically.     Last office visit: 11/22/22  Last time refill sent and quantity/refills: 11/21/22 #40 with 1   Per [place]  last dispensed 3/13/23    TO Dr. Josue Butler-- please see below and advise, pending for alprazolam 0.5mg

## 2023-03-23 RX ORDER — ALPRAZOLAM 0.5 MG/1
0.25 TABLET ORAL EVERY 6 HOURS PRN
Qty: 20 TABLET | Refills: 1 | Status: SHIPPED | OUTPATIENT
Start: 2023-03-23

## 2023-04-27 NOTE — TELEPHONE ENCOUNTER
refilled Alternatives Discussed Intro (Do Not Add Period): I discussed alternative treatments to Mohs surgery and specifically discussed the risks and benefits of

## 2023-06-03 DIAGNOSIS — F41.9 ANXIETY: ICD-10-CM

## 2023-06-08 RX ORDER — ESCITALOPRAM OXALATE 10 MG/1
TABLET ORAL
Qty: 30 TABLET | Refills: 6 | Status: SHIPPED | OUTPATIENT
Start: 2023-06-08

## 2023-08-30 ENCOUNTER — TELEPHONE (OUTPATIENT)
Dept: INTERNAL MEDICINE CLINIC | Facility: CLINIC | Age: 68
End: 2023-08-30

## 2023-08-30 DIAGNOSIS — E55.9 VITAMIN D DEFICIENCY: ICD-10-CM

## 2023-08-30 DIAGNOSIS — Z12.5 SCREENING PSA (PROSTATE SPECIFIC ANTIGEN): ICD-10-CM

## 2023-08-30 DIAGNOSIS — Z00.00 PHYSICAL EXAM: Primary | ICD-10-CM

## 2023-09-05 ENCOUNTER — TELEPHONE (OUTPATIENT)
Dept: INTERNAL MEDICINE CLINIC | Facility: CLINIC | Age: 68
End: 2023-09-05

## 2023-09-05 DIAGNOSIS — F41.9 ANXIETY: ICD-10-CM

## 2023-09-06 NOTE — TELEPHONE ENCOUNTER
To MD:  The above refill request is for a controlled substance. Please review pended medication order. Print and sign for staff to fax to pharmacy or prescribe electronically.     Last office visit: 11/22/22    Last time refill sent and quantity/refills:      Dispensed Written Strength Quantity Refills Days Supply Provider Pharmacy    ALPRAZOLAM 06/03/2023 03/23/2023 0.5 mg 20 tablet  10 D'AMICO, JEFF A. DO [id number]

## 2023-09-07 RX ORDER — ALPRAZOLAM 0.25 MG/1
0.25 TABLET ORAL EVERY 6 HOURS PRN
Qty: 40 TABLET | Refills: 1 | Status: SHIPPED | OUTPATIENT
Start: 2023-09-07

## 2023-10-20 ENCOUNTER — LAB ENCOUNTER (OUTPATIENT)
Dept: LAB | Age: 68
End: 2023-10-20
Attending: INTERNAL MEDICINE

## 2023-10-20 DIAGNOSIS — Z00.00 PHYSICAL EXAM: ICD-10-CM

## 2023-10-20 DIAGNOSIS — E55.9 VITAMIN D DEFICIENCY: ICD-10-CM

## 2023-10-20 DIAGNOSIS — Z12.5 SCREENING PSA (PROSTATE SPECIFIC ANTIGEN): ICD-10-CM

## 2023-10-20 LAB
ALBUMIN SERPL-MCNC: 3.2 G/DL (ref 3.4–5)
ALBUMIN/GLOB SERPL: 0.8 {RATIO} (ref 1–2)
ALP LIVER SERPL-CCNC: 114 U/L
ALT SERPL-CCNC: 32 U/L
ANION GAP SERPL CALC-SCNC: 6 MMOL/L (ref 0–18)
AST SERPL-CCNC: 21 U/L (ref 15–37)
BASOPHILS # BLD AUTO: 0.05 X10(3) UL (ref 0–0.2)
BASOPHILS NFR BLD AUTO: 1 %
BILIRUB SERPL-MCNC: 0.3 MG/DL (ref 0.1–2)
BILIRUB UR QL: NEGATIVE
BUN BLD-MCNC: 20 MG/DL (ref 7–18)
BUN/CREAT SERPL: 22 (ref 10–20)
CALCIUM BLD-MCNC: 8.7 MG/DL (ref 8.5–10.1)
CHLORIDE SERPL-SCNC: 111 MMOL/L (ref 98–112)
CHOLEST SERPL-MCNC: 215 MG/DL (ref ?–200)
CLARITY UR: CLEAR
CO2 SERPL-SCNC: 27 MMOL/L (ref 21–32)
COMPLEXED PSA SERPL-MCNC: 1.01 NG/ML (ref ?–4)
CREAT BLD-MCNC: 0.91 MG/DL
DEPRECATED RDW RBC AUTO: 39.3 FL (ref 35.1–46.3)
EGFRCR SERPLBLD CKD-EPI 2021: 91 ML/MIN/1.73M2 (ref 60–?)
EOSINOPHIL # BLD AUTO: 0.16 X10(3) UL (ref 0–0.7)
EOSINOPHIL NFR BLD AUTO: 3.2 %
ERYTHROCYTE [DISTWIDTH] IN BLOOD BY AUTOMATED COUNT: 11.8 % (ref 11–15)
FASTING PATIENT LIPID ANSWER: YES
FASTING STATUS PATIENT QL REPORTED: YES
GLOBULIN PLAS-MCNC: 3.9 G/DL (ref 2.8–4.4)
GLUCOSE BLD-MCNC: 123 MG/DL (ref 70–99)
GLUCOSE UR-MCNC: NORMAL MG/DL
HCT VFR BLD AUTO: 42.6 %
HDLC SERPL-MCNC: 38 MG/DL (ref 40–59)
HGB BLD-MCNC: 15 G/DL
HGB UR QL STRIP.AUTO: NEGATIVE
IMM GRANULOCYTES # BLD AUTO: 0.03 X10(3) UL (ref 0–1)
IMM GRANULOCYTES NFR BLD: 0.6 %
KETONES UR-MCNC: NEGATIVE MG/DL
LDLC SERPL CALC-MCNC: 138 MG/DL (ref ?–100)
LEUKOCYTE ESTERASE UR QL STRIP.AUTO: NEGATIVE
LYMPHOCYTES # BLD AUTO: 1.82 X10(3) UL (ref 1–4)
LYMPHOCYTES NFR BLD AUTO: 36.1 %
MCH RBC QN AUTO: 32.1 PG (ref 26–34)
MCHC RBC AUTO-ENTMCNC: 35.2 G/DL (ref 31–37)
MCV RBC AUTO: 91 FL
MONOCYTES # BLD AUTO: 0.56 X10(3) UL (ref 0.1–1)
MONOCYTES NFR BLD AUTO: 11.1 %
NEUTROPHILS # BLD AUTO: 2.42 X10 (3) UL (ref 1.5–7.7)
NEUTROPHILS # BLD AUTO: 2.42 X10(3) UL (ref 1.5–7.7)
NEUTROPHILS NFR BLD AUTO: 48 %
NITRITE UR QL STRIP.AUTO: NEGATIVE
NONHDLC SERPL-MCNC: 177 MG/DL (ref ?–130)
OSMOLALITY SERPL CALC.SUM OF ELEC: 302 MOSM/KG (ref 275–295)
PH UR: 6 [PH] (ref 5–8)
PLATELET # BLD AUTO: 319 10(3)UL (ref 150–450)
POTASSIUM SERPL-SCNC: 4 MMOL/L (ref 3.5–5.1)
PROT SERPL-MCNC: 7.1 G/DL (ref 6.4–8.2)
PROT UR-MCNC: NEGATIVE MG/DL
RBC # BLD AUTO: 4.68 X10(6)UL
SODIUM SERPL-SCNC: 144 MMOL/L (ref 136–145)
SP GR UR STRIP: 1.02 (ref 1–1.03)
TRIGL SERPL-MCNC: 215 MG/DL (ref 30–149)
TSI SER-ACNC: 2.02 MIU/ML (ref 0.36–3.74)
UROBILINOGEN UR STRIP-ACNC: NORMAL
VIT D+METAB SERPL-MCNC: 61.7 NG/ML (ref 30–100)
VLDLC SERPL CALC-MCNC: 40 MG/DL (ref 0–30)
WBC # BLD AUTO: 5 X10(3) UL (ref 4–11)

## 2023-10-20 PROCEDURE — 84443 ASSAY THYROID STIM HORMONE: CPT

## 2023-10-20 PROCEDURE — 80061 LIPID PANEL: CPT

## 2023-10-20 PROCEDURE — 80053 COMPREHEN METABOLIC PANEL: CPT

## 2023-10-20 PROCEDURE — 82306 VITAMIN D 25 HYDROXY: CPT

## 2023-10-20 PROCEDURE — 85025 COMPLETE CBC W/AUTO DIFF WBC: CPT

## 2023-10-20 PROCEDURE — 81003 URINALYSIS AUTO W/O SCOPE: CPT | Performed by: INTERNAL MEDICINE

## 2023-10-20 PROCEDURE — 36415 COLL VENOUS BLD VENIPUNCTURE: CPT

## 2023-10-24 ENCOUNTER — OFFICE VISIT (OUTPATIENT)
Dept: INTERNAL MEDICINE CLINIC | Facility: CLINIC | Age: 68
End: 2023-10-24

## 2023-10-24 VITALS
DIASTOLIC BLOOD PRESSURE: 70 MMHG | HEIGHT: 66 IN | TEMPERATURE: 98 F | SYSTOLIC BLOOD PRESSURE: 122 MMHG | HEART RATE: 76 BPM | WEIGHT: 171 LBS | BODY MASS INDEX: 27.48 KG/M2 | OXYGEN SATURATION: 96 %

## 2023-10-24 DIAGNOSIS — F41.9 ANXIETY: ICD-10-CM

## 2023-10-24 DIAGNOSIS — E53.8 B12 DEFICIENCY: ICD-10-CM

## 2023-10-24 DIAGNOSIS — R73.9 BLOOD GLUCOSE ELEVATED: ICD-10-CM

## 2023-10-24 DIAGNOSIS — R77.0 LOW SERUM ALBUMIN: ICD-10-CM

## 2023-10-24 DIAGNOSIS — R53.83 OTHER FATIGUE: Primary | ICD-10-CM

## 2023-10-24 PROCEDURE — 99214 OFFICE O/P EST MOD 30 MIN: CPT | Performed by: INTERNAL MEDICINE

## 2023-10-24 PROCEDURE — 96372 THER/PROPH/DIAG INJ SC/IM: CPT | Performed by: INTERNAL MEDICINE

## 2023-10-24 RX ORDER — CYANOCOBALAMIN 1000 UG/ML
1000 INJECTION, SOLUTION INTRAMUSCULAR; SUBCUTANEOUS ONCE
Status: COMPLETED | OUTPATIENT
Start: 2023-10-24 | End: 2023-10-24

## 2023-10-24 RX ORDER — FLUTICASONE PROPIONATE 50 MCG
1 SPRAY, SUSPENSION (ML) NASAL DAILY PRN
COMMUNITY

## 2023-10-24 RX ORDER — ESCITALOPRAM OXALATE 10 MG/1
5 TABLET ORAL DAILY
COMMUNITY
Start: 2023-10-24

## 2023-10-24 RX ADMIN — CYANOCOBALAMIN 1000 MCG: 1000 INJECTION, SOLUTION INTRAMUSCULAR; SUBCUTANEOUS at 10:33:00

## 2023-10-24 NOTE — PATIENT INSTRUCTIONS
1. Other fatigue  I like the idea of the trial of the B12 injection, seeing how well this works, how long it lasts, try to get some feedback with this online over the next few days  I do like the idea of may be considering going back in for blood draw checking the testosterone levels, may be some other lab work as well to confirm the blood sugar levels  Changing the diet around as we discussed, keeping a calorie amounts the same but trying to continue with the exercise, and more frequent feeding would be my recommendations as discussed  - Testosterone,Total and Weakly Bound w/ SHBG; Future    2. B12 deficiency  Stable continue current monitoring management  - cyanocobalamin (Vitamin B12) 1000 MCG/ML injection 1,000 mcg    3. Anxiety  Lets consider lowering the Lexapro to one half dosage daily  - escitalopram 10 MG Oral Tab; Take 0.5 tablets (5 mg total) by mouth daily. 4. Low serum albumin  As above continue to track this number    5.  Blood glucose elevated  Lab work in the near future if we can

## 2023-12-05 ENCOUNTER — TELEPHONE (OUTPATIENT)
Dept: INTERNAL MEDICINE CLINIC | Facility: CLINIC | Age: 68
End: 2023-12-05

## 2023-12-05 DIAGNOSIS — F41.9 ANXIETY: ICD-10-CM

## 2023-12-05 NOTE — TELEPHONE ENCOUNTER
----- Message from Johnnie Madden sent at 12/5/2023  3:39 PM CST -----  Regarding: How to continue with Vit B injections?  Contact: [phone]  Can you have Deny call me on cell to explain when and how to continue with Vitamin B shots?

## 2023-12-05 NOTE — TELEPHONE ENCOUNTER
----- Message from Johnnie Madden sent at 12/5/2023  3:39 PM CST -----  Regarding: How to continue with Vit B injections?  Contact: [phone]  Can you have Deny call me on cell to explain when and how to continue with Vitamin B shots?

## 2023-12-07 RX ORDER — ALPRAZOLAM 0.25 MG/1
0.25 TABLET ORAL EVERY 6 HOURS PRN
Qty: 40 TABLET | Refills: 1 | Status: SHIPPED | OUTPATIENT
Start: 2023-12-07

## 2023-12-07 NOTE — TELEPHONE ENCOUNTER
To MD:  The above refill request is for a controlled substance. Please review pended medication order. Print and sign for staff to fax to pharmacy or prescribe electronically.     Last office visit:  10/2023  Last time refill sent and quantity/refills:   9/7/2023

## 2023-12-07 NOTE — TELEPHONE ENCOUNTER
You will have to call and ask him based on his last injection of B12 here in the office on 10/24, what type of improvement and symptomatic help the B12 injection gave him, and approximately how many days did at last.  Based on this information we should be dose B12 every 1 to 2 weeks for 8 weeks before going to every month after that, he should be familiar with this conversation we had at his last visit.

## 2023-12-14 NOTE — TELEPHONE ENCOUNTER
Noted, nursing lets set him up with Rena, or if we can set him up with injections here I like him to have B12 injections every 2 weeks for the next 7 doses, then to every month after that.  If they can do some teaching with Rena, inject at home themselves, you can certainly set them up with that plan of action and/or medications.  You can certainly give him the options.  Okay to place orders after you speak with the patient.

## 2023-12-31 DIAGNOSIS — F41.9 ANXIETY: ICD-10-CM

## 2024-01-08 NOTE — TELEPHONE ENCOUNTER
Pt. Called following up on refill request for Lexapro.  Advised pt. Of [place] msg below.  Pt. States he does not recall any change in dosage for this medication.   He is out of the medication and asking for a refill today.  Please send to [facility].

## 2024-01-08 NOTE — TELEPHONE ENCOUNTER
To D - 10/2024 OV states lower dose to 5 mg ;   pt reports taking 10 mg daily;  pls review pended Rx

## 2024-01-08 NOTE — TELEPHONE ENCOUNTER
To Rena--ok to continue filling at dose patient has been taking?  Per message below, patient did not recall any change in dose    At 10/24 OV:  3. Anxiety  Lets consider lowering the Lexapro to one half dosage daily  - escitalopram 10 MG Oral Tab; Take 0.5 tablets (5 mg total) by mouth daily.

## 2024-01-09 RX ORDER — ESCITALOPRAM OXALATE 10 MG/1
10 TABLET ORAL DAILY
Qty: 90 TABLET | Refills: 3 | Status: SHIPPED | OUTPATIENT
Start: 2024-01-09

## 2024-01-09 NOTE — TELEPHONE ENCOUNTER
Noted, refilled, he should continue taking the current dose that he is taking, I am unsure whether he is cutting these in half or taking a full tablet.  Nursing you may have to let him know he should resume previous dosing and stay on this medication.

## 2024-01-09 NOTE — TELEPHONE ENCOUNTER
Patient calling back to speak to nurse. States Lisha left message to speak to nursing.    Please call patient back at [phone]

## 2024-01-09 NOTE — TELEPHONE ENCOUNTER
Spoke with patient. Relayed MD message. Pt verbalized understanding.   Pt will continue taking Escitalopram 10 mg daily.

## 2024-01-09 NOTE — TELEPHONE ENCOUNTER
, please call patient and schedule B12 injection training with Rena. May need to coordinate with Rena. Thank you.

## 2024-02-01 ENCOUNTER — NURSE ONLY (OUTPATIENT)
Dept: INTERNAL MEDICINE CLINIC | Facility: CLINIC | Age: 69
End: 2024-02-01

## 2024-02-01 ENCOUNTER — TELEPHONE (OUTPATIENT)
Dept: INTERNAL MEDICINE CLINIC | Facility: CLINIC | Age: 69
End: 2024-02-01

## 2024-02-01 DIAGNOSIS — E53.8 B12 DEFICIENCY: Primary | ICD-10-CM

## 2024-02-01 RX ORDER — CYANOCOBALAMIN 1000 UG/ML
1000 INJECTION, SOLUTION INTRAMUSCULAR; SUBCUTANEOUS ONCE
Status: SHIPPED | OUTPATIENT
Start: 2024-02-01

## 2024-02-01 NOTE — TELEPHONE ENCOUNTER
Pt was here for appt for self administration of Vit B 12. pt had to leave to open his office.  Advised to re-sched appt.

## 2024-07-29 ENCOUNTER — TELEPHONE (OUTPATIENT)
Dept: INTERNAL MEDICINE CLINIC | Facility: CLINIC | Age: 69
End: 2024-07-29

## 2024-07-29 DIAGNOSIS — F41.9 ANXIETY: ICD-10-CM

## 2024-07-29 RX ORDER — ALPRAZOLAM 0.25 MG/1
0.25 TABLET ORAL EVERY 6 HOURS PRN
Qty: 40 TABLET | Refills: 1 | Status: SHIPPED | OUTPATIENT
Start: 2024-07-29

## 2024-07-29 NOTE — TELEPHONE ENCOUNTER
To MD:  The above refill request is for a controlled substance.  Please review pended medication order.   Print and sign for staff to fax to pharmacy or prescribe electronically.    Last office visit: 10/24/23  Last time refill sent and quantity/refills: 12/7/23  #40/1      Medication Dispense History (from 1/31/2024 to 7/29/2024)  Expand All  Collapse All  ALPRAZolam     Dispensed Written Strength Quantity Refills Days Supply Provider Pharmacy   ALPRAZOLAM 03/31/2024 12/07/2023 0.25 mg 40  10 D'AMICO, JEFF A. DO [facility]

## 2024-07-29 NOTE — TELEPHONE ENCOUNTER
Patient called, requesting refill for:  Alprazolam   [facility] uses [facility] as [facility]  Tasked to RX

## 2024-10-05 ENCOUNTER — LAB ENCOUNTER (OUTPATIENT)
Dept: LAB | Age: 69
End: 2024-10-05
Attending: INTERNAL MEDICINE
Payer: MEDICARE

## 2024-10-05 DIAGNOSIS — R53.83 OTHER FATIGUE: ICD-10-CM

## 2024-10-05 DIAGNOSIS — R73.9 BLOOD GLUCOSE ELEVATED: ICD-10-CM

## 2024-10-05 LAB
ALBUMIN SERPL-MCNC: 4.3 G/DL (ref 3.2–4.8)
ALBUMIN/GLOB SERPL: 1.3 {RATIO} (ref 1–2)
ALP LIVER SERPL-CCNC: 82 U/L
ALT SERPL-CCNC: 27 U/L
ANION GAP SERPL CALC-SCNC: 8 MMOL/L (ref 0–18)
AST SERPL-CCNC: 26 U/L (ref ?–34)
BILIRUB SERPL-MCNC: 1 MG/DL (ref 0.2–1.1)
BUN BLD-MCNC: 13 MG/DL (ref 9–23)
BUN/CREAT SERPL: 15.1 (ref 10–20)
CALCIUM BLD-MCNC: 9.1 MG/DL (ref 8.7–10.4)
CHLORIDE SERPL-SCNC: 109 MMOL/L (ref 98–112)
CO2 SERPL-SCNC: 25 MMOL/L (ref 21–32)
CREAT BLD-MCNC: 0.86 MG/DL
EGFRCR SERPLBLD CKD-EPI 2021: 93 ML/MIN/1.73M2 (ref 60–?)
EST. AVERAGE GLUCOSE BLD GHB EST-MCNC: 111 MG/DL (ref 68–126)
FASTING STATUS PATIENT QL REPORTED: YES
GLOBULIN PLAS-MCNC: 3.2 G/DL (ref 2–3.5)
GLUCOSE BLD-MCNC: 101 MG/DL (ref 70–99)
HBA1C MFR BLD: 5.5 % (ref ?–5.7)
OSMOLALITY SERPL CALC.SUM OF ELEC: 294 MOSM/KG (ref 275–295)
POTASSIUM SERPL-SCNC: 4.1 MMOL/L (ref 3.5–5.1)
PROT SERPL-MCNC: 7.5 G/DL (ref 5.7–8.2)
SODIUM SERPL-SCNC: 142 MMOL/L (ref 136–145)

## 2024-10-05 PROCEDURE — 83036 HEMOGLOBIN GLYCOSYLATED A1C: CPT

## 2024-10-05 PROCEDURE — 80053 COMPREHEN METABOLIC PANEL: CPT

## 2024-10-05 PROCEDURE — 84410 TESTOSTERONE BIOAVAILABLE: CPT

## 2024-10-05 PROCEDURE — 36415 COLL VENOUS BLD VENIPUNCTURE: CPT

## 2024-10-08 ENCOUNTER — OFFICE VISIT (OUTPATIENT)
Dept: INTERNAL MEDICINE CLINIC | Facility: CLINIC | Age: 69
End: 2024-10-08

## 2024-10-08 VITALS
HEART RATE: 70 BPM | BODY MASS INDEX: 27 KG/M2 | RESPIRATION RATE: 16 BRPM | SYSTOLIC BLOOD PRESSURE: 118 MMHG | WEIGHT: 168 LBS | OXYGEN SATURATION: 98 % | TEMPERATURE: 99 F | DIASTOLIC BLOOD PRESSURE: 60 MMHG | HEIGHT: 66 IN

## 2024-10-08 DIAGNOSIS — H93.13 TINNITUS OF BOTH EARS: ICD-10-CM

## 2024-10-08 DIAGNOSIS — E53.8 B12 DEFICIENCY: ICD-10-CM

## 2024-10-08 DIAGNOSIS — F41.9 ANXIETY: ICD-10-CM

## 2024-10-08 DIAGNOSIS — E78.01 FAMILIAL HYPERCHOLESTEROLEMIA: ICD-10-CM

## 2024-10-08 DIAGNOSIS — H91.93 BILATERAL HEARING LOSS, UNSPECIFIED HEARING LOSS TYPE: ICD-10-CM

## 2024-10-08 DIAGNOSIS — Z00.00 ENCOUNTER FOR ANNUAL HEALTH EXAMINATION: Primary | ICD-10-CM

## 2024-10-08 DIAGNOSIS — Z12.5 SCREENING PSA (PROSTATE SPECIFIC ANTIGEN): ICD-10-CM

## 2024-10-08 DIAGNOSIS — M54.16 LUMBAR RADICULOPATHY: ICD-10-CM

## 2024-10-08 DIAGNOSIS — Z91.09 ENVIRONMENTAL ALLERGIES: ICD-10-CM

## 2024-10-08 LAB
ATRIAL RATE: 76 BPM
P AXIS: 66 DEGREES
P-R INTERVAL: 182 MS
Q-T INTERVAL: 398 MS
QRS DURATION: 92 MS
QTC CALCULATION (BEZET): 447 MS
R AXIS: 26 DEGREES
T AXIS: 56 DEGREES
VENTRICULAR RATE: 76 BPM

## 2024-10-08 PROCEDURE — 93000 ELECTROCARDIOGRAM COMPLETE: CPT | Performed by: INTERNAL MEDICINE

## 2024-10-08 PROCEDURE — G0439 PPPS, SUBSEQ VISIT: HCPCS | Performed by: INTERNAL MEDICINE

## 2024-10-08 PROCEDURE — 99214 OFFICE O/P EST MOD 30 MIN: CPT | Performed by: INTERNAL MEDICINE

## 2024-10-08 RX ORDER — LEVOCETIRIZINE DIHYDROCHLORIDE 5 MG/1
5 TABLET, FILM COATED ORAL EVERY EVENING
COMMUNITY

## 2024-10-08 RX ORDER — CYANOCOBALAMIN 1000 UG/ML
1000 INJECTION, SOLUTION INTRAMUSCULAR; SUBCUTANEOUS WEEKLY
Qty: 22 EACH | Refills: 0 | Status: SHIPPED | OUTPATIENT
Start: 2024-10-08

## 2024-10-08 RX ORDER — MAGNESIUM 30 MG
30 TABLET ORAL 2 TIMES DAILY
COMMUNITY

## 2024-10-08 RX ORDER — MULTIVIT WITH MINERALS/LUTEIN
1000 TABLET ORAL DAILY
COMMUNITY

## 2024-10-08 RX ORDER — BUPROPION HYDROCHLORIDE 150 MG/1
150 TABLET ORAL DAILY
Qty: 90 TABLET | Refills: 3 | Status: SHIPPED | OUTPATIENT
Start: 2024-10-08

## 2024-10-08 RX ORDER — ALPRAZOLAM 0.25 MG
0.25 TABLET ORAL EVERY 6 HOURS PRN
Qty: 40 TABLET | Refills: 1 | Status: SHIPPED | OUTPATIENT
Start: 2024-10-08

## 2024-10-08 NOTE — PROGRESS NOTES
Subjective:   Johnnie Madden is a 69 year old male who presents for a Medicare Wellness Visit charge within the last 11 months and Patient may not meet criteria for AWV: Please evaluate for correct coding and scheduled follow up of multiple significant but stable problems.   Chief Complaint   Patient presents with    Physical     MEDICARE ANNUAL, Right foot pain X 2 month and difficulty walking  X 2 month, denies trauma.  Bilat tinnitus getting worse X 5 years.     Patient is here today for physical exam, patient is up-to-date with age-related standard of care screening and vaccination recommendations, this was discussed at length and they verbalized understanding of any deficiencies.    Patient presents for a Medicare Subsequent Annual Wellness visit and  followup regarding chronic medical problems and/or refills as listed below in the assessment and plan    Low back pain: With right sided radiculopathy: Patient claims to have some pain down the right leg, is progressing over the past few months, has had a number of years of a stiff back, has changes environment at work, normally does exercise, but has gained weight, and has had trouble exercising without aggravating, when he takes a step he does feel some pain nerve type pain down the back of the leg and into the right calf, he is concerned about this.    History/Other:   Fall Risk Assessment:   He has been screened for Falls and is High Risk. Fall Prevention information provided to patient in After Visit Summary.    Do you feel unsteady when standing or walking?: No  Do you worry about falling?: Yes  Have you fallen in the past year?: Yes  How many times have you fallen?: 1  Were you injured?: No     Cognitive Assessment:   He had a completely normal cognitive assessment - see flowsheet entries     Functional Ability/Status:   See assessment    Depression Screening (PHQ):  PHQ-2 SCORE: 0  , done 10/8/2024   Last [place] Suicide Screening on 10/8/2024 was No  Risk.          Advanced Directives:   He does have a Living Will but we do NOT have it on file in Epic.    He does have a POA but we do NOT have it on file in Epic.    Discussed Advance Care Planning with patient (and family/surrogate if present). Standard forms made available to patient in After Visit Summary.      Patient Active Problem List   Diagnosis    Familial hypercholesterolemia    Anxiety    Environmental allergies     Allergies:  He has No Known Allergies.    Current Medications:  Outpatient Medications Marked as Taking for the 10/8/24 encounter (Office Visit) with D'Amico, Jeff Anthony, DO   Medication Sig    levocetirizine 5 MG Oral Tab Take 1 tablet (5 mg total) by mouth every evening.    Multiple Vitamins-Minerals (MENS MULTI VITAMIN & MINERAL) Oral Tab Take by mouth.    magnesium 30 MG Oral Tab Take 1 tablet (30 mg total) by mouth 2 (two) times daily.    Ascorbic Acid (VITAMIN C) 1000 MG Oral Tab Take 1 tablet (1,000 mg total) by mouth daily.    B Complex Vitamins (VITAMIN B COMPLEX OR) Take by mouth.    NON FORMULARY Take 2 capsules by mouth daily. LUTEIN, XANTHINE,    cyanocobalamin 1000 MCG/ML Injection Solution Inject 1 mL (1,000 mcg total) into the muscle once a week.    Syringe 25G X 5/8\" 3 ML Does not apply Misc Use for IM injections once monthly    ALPRAZolam 0.25 MG Oral Tab Take 1 tablet (0.25 mg total) by mouth every 6 (six) hours as needed for Anxiety.    buPROPion  MG Oral Tablet 24 Hr Take 1 tablet (150 mg total) by mouth daily.    ESCITALOPRAM 10 MG Oral Tab TAKE ONE TABLET BY MOUTH ONE TIME DAILY    fluticasone propionate 50 MCG/ACT Nasal Suspension 1 spray by Each Nare route daily as needed for Rhinitis.     Current Facility-Administered Medications for the 10/8/24 encounter (Office Visit) with D'Amico, Jeff Anthony,    Medication    cyanocobalamin (Vitamin B12) 1000 MCG/ML injection 1,000 mcg       Medical History:  He  has no past medical history on file.  Surgical  History:  He  has a past surgical history that includes colonoscopy (05/2018).   Family History:  His family history includes Diabetes in his brother and mother; Hypertension in his brother.  Social History:  He  reports that he has never smoked. He has never used smokeless tobacco. He reports current alcohol use. He reports that he does not use drugs.    Tobacco:  He has never smoked tobacco.    CAGE Alcohol Screen:   CAGE screening score of 0 on 10/8/2024, showing low risk of alcohol abuse.      Patient Care Team:  D'Amico, Jeff Anthony, DO as PCP - General (Internal Medicine)  Darrell Chicas MD (GASTROENTEROLOGY)    Review of Systems  Constitutional: Negative for Chills, fatigue, fever, malaise, weight gain and weight loss.  ENMT: Negative for Nasal drainage and sinus pressure.  Eyes: Negative for Vision changes.  Respiratory: Negative for Cough, dyspnea and wheezing.  Cardio: Negative Chest pain and irregular heartbeat/palpitations.  GI: Negative for Abdominal pain, constipation, diarrhea, heartburn, nausea and vomiting.  : Negative for Dysuria and urinary frequency.  Endocrine: Negative for Cold intolerance and heat intolerance.  Neuro: Negative for Gait disturbance and memory impairment.  Psych: Negative for Anxiety and depression.  Integumentary: Negative for Hives and rash.  MS: Negative muscle weakness. pos for joint pain  Hema/Lymph: Negative Easy bleeding and easy bruising.  Allergic/Immuno: Negative Environmental allergies and food allergies.      Objective:   Physical Exam  PHYSICAL EXAMINATION:  Vital signs: See chart   Gen. exam: Alert and oriented, in no acute distress   HEENT: Pupils equal and reactive to light and accommodation, moist mucous membranes  Neck exam:  Supple with baseline range of motion.  Normal thyroid trachea midline, no JVD  Heart exam: Regular rate and rhythm no murmurs no S3 no S4   Lung exam: No rales no rhonchi no wheezes  Abdominal exam: Soft nontender, nondistended  positive bowel sounds are normoactive  Extremities exam: no clubbing no cyanosis no edema  Skin exam: see wound notes for details, no rashes  Neurological exam: Cranial nerves II through XII intact, no gross deficits  Musculoskeletal exam: no Arthritis appreciated, no obvious deformity.  Low back exam: With straight leg raise positive on the right at approximately 35 degrees, no crossover pain on the left, radicular pain reduced with augmentation of dorsiflexion, no loss of size or strength, reflexes intact bilaterally.  Good mobility to the lower spine restricted rotation to the right  : deferred to urology  /60   Pulse 70   Temp 99 °F (37.2 °C) (Oral)   Resp 16   Ht 5' 6\" (1.676 m)   Wt 168 lb (76.2 kg)   SpO2 98%   BMI 27.12 kg/m²  Estimated body mass index is 27.12 kg/m² as calculated from the following:    Height as of this encounter: 5' 6\" (1.676 m).    Weight as of this encounter: 168 lb (76.2 kg).    Medicare Hearing Assessment:   Hearing Screening    Time taken: 10/8/2024 10:25 AM  Screening Method: Whisper Test  Whisper Test Result: Pass         Visual Acuity:   Right Eye Visual Acuity: Corrected Right Eye Chart Acuity: 20/20   Left Eye Visual Acuity: Corrected Left Eye Chart Acuity: 20/20   Both Eyes Visual Acuity: Corrected Both Eyes Chart Acuity: 20/20   Able To Tolerate Visual Acuity: Yes        Assessment & Plan:   Johnnie Madden is a 69 year old male who presents for a Medicare Assessment.     1. Encounter for annual health examination (Primary)  2. Familial hypercholesterolemia  -     EKG In-Office [39637]  -     Detailed, Mod Complex (99214)  -     CBC With Differential With Platelet; Future; Expected date: 10/08/2024  -     TSH W Reflex To Free T4; Future; Expected date: 10/08/2024  -     Lipid Panel; Future; Expected date: 10/08/2024  -     CT CALCIUM SCORING; Future; Expected date: 10/08/2024  3. Environmental allergies  -     Detailed, Mod Complex (00764)  4. Anxiety  -      Detailed, Mod Complex (63831)  -     ALPRAZolam; Take 1 tablet (0.25 mg total) by mouth every 6 (six) hours as needed for Anxiety.  Dispense: 40 tablet; Refill: 1  -     buPROPion HCl ER (XL); Take 1 tablet (150 mg total) by mouth daily.  Dispense: 90 tablet; Refill: 3  5. B12 deficiency  -     Urinalysis with Culture Reflex  -     Cyanocobalamin; Inject 1 mL (1,000 mcg total) into the muscle once a week.  Dispense: 22 each; Refill: 0  -     Syringe; Use for IM injections once monthly  Dispense: 50 each; Refill: 0  6. Screening PSA (prostate specific antigen)  -     PSA Total, Screen; Future; Expected date: 10/08/2024  7. Lumbar radiculopathy  -     Physical Therapy Referral - [facility] Locations  -     XR LUMBAR SPINE (MIN 4 VIEWS) (CPT=72110); Future; Expected date: 10/08/2024  -     Physiatry Referral - In Network  8. Bilateral hearing loss, unspecified hearing loss type  -     Audiology Referral - [facility])  9. Tinnitus of both ears  -     Audiology Referral - [facility])  1. Encounter for annual health examination  Physical exam instruction: Improve diet and exercise, complete fasting labs in the near future and you will be called with results 5-7 days after completed, call with questions.  Call the central scheduling number at [phone] to schedule at any of the [facility] locations    2. Familial hypercholesterolemia  Lets complete the lab work year, I am sorry you have to go in for a second drawl, urine testing as well  And lets think about doing the calcium scoring test in the near future, we can use this to help objectify whether we need some more aggressive treatment with the cholesterol, continue current dietary changes for now  - EKG In-Office [[id number]]: This rhythm at 76 bpm, no ST-T wave changes, normal EKG.  - Detailed, Mod Complex (02733)  - CBC With Differential With Platelet; Future  - TSH W Reflex To Free T4; Future  - Lipid Panel; Future  - CT CALCIUM SCORING;  Future    3. Environmental allergies  Stable nice job here continue current medications monitoring management  - Detailed, Mod Complex (60440)    4. Anxiety  Stable continue current medications  Lets change over the Lexapro to Wellbutrin 150 mg daily, I did send this to your pharmacy, it is a one-to-one change over, using the Xanax as you have been in the past  - Detailed, Mod Complex (77369)  - ALPRAZolam 0.25 MG Oral Tab; Take 1 tablet (0.25 mg total) by mouth every 6 (six) hours as needed for Anxiety.  Dispense: 40 tablet; Refill: 1    5. B12 deficiency  B12 injections once weekly for 8 weeks then go to every month after that, self injections  - Urinalysis with Culture Reflex  - cyanocobalamin 1000 MCG/ML Injection Solution; Inject 1 mL (1,000 mcg total) into the muscle once a week.  Dispense: 22 each; Refill: 0  - Syringe 25G X 5/8\" 3 ML Does not apply Misc; Use for IM injections once monthly  Dispense: 50 each; Refill: 0    6. Screening PSA (prostate specific antigen)  Stable cont monitoring and management  - PSA Total, Screen; Future    7. Lumbar radiculopathy  Lets get some x-rays done, and set up physical therapy with the recommended therapist, and I will notify with results once completed I do like the idea of the call to the physiatry team, setting a specialist visit up in the next 4 to 6 weeks with him as well, he can certainly use any of the available partners if we need  They have taken to the neck step, but I do want a couple of weeks of physical therapy with the preceding x-ray before going to that visit if we can time that properly  - Physical Therapy Referral - [facility] Locations  - XR LUMBAR SPINE (MIN 4 VIEWS) (CPT=72110); Future  - Physiatry Referral - In Network    8. Bilateral hearing loss, unspecified hearing loss type  Stable continue current monitoring management here, audiology clinic visit  - Audiology Referral - [facility] ([facility])    9. Tinnitus of both ears  As above.  -  Audiology Referral - [place] ([facility])    The patient indicates understanding of these issues and agrees to the plan.  Reinforced healthy diet, lifestyle, and exercise.      Return in about 6 months (around 4/8/2025).     Jeff Anthony D'Amico, DO, 10/8/2024     Supplementary Documentation:   General Health:  At any time do you feel concerned for the safety/well-being of yourself and/or your children, in your home or elsewhere?: No    Health Maintenance   Topic Date Due    Zoster Vaccines (1 of 2) Never done    Pneumococcal Vaccine: 65+ Years (2 of 2 - PPSV23 or PCV20) 11/27/2021    Colorectal Cancer Screening  05/16/2023    COVID-19 Vaccine (7 - 2023-24 season) 09/01/2024    Annual Physical  10/08/2025    PSA  10/20/2025    Influenza Vaccine  Completed    Annual Depression Screening  Completed    Fall Risk Screening (Annual)  Completed

## 2024-10-08 NOTE — PATIENT INSTRUCTIONS
1. Encounter for annual health examination  Physical exam instruction: Improve diet and exercise, complete fasting labs in the near future and you will be called with results 5-7 days after completed, call with questions.  Call the central scheduling number at [phone] to schedule at any of the [facility] locations    2. Familial hypercholesterolemia  Lets complete the lab work year, I am sorry you have to go in for a second drawl, urine testing as well  And lets think about doing the calcium scoring test in the near future, we can use this to help objectify whether we need some more aggressive treatment with the cholesterol, continue current dietary changes for now  - EKG In-Office [[id number]]: This rhythm at 76 bpm, no ST-T wave changes, normal EKG.  - Detailed, Mod Complex (88846)  - CBC With Differential With Platelet; Future  - TSH W Reflex To Free T4; Future  - Lipid Panel; Future  - CT CALCIUM SCORING; Future    3. Environmental allergies  Stable nice job here continue current medications monitoring management  - Detailed, Mod Complex (30809)    4. Anxiety  Stable continue current medications  Lets change over the Lexapro to Wellbutrin 150 mg daily, I did send this to your pharmacy, it is a one-to-one change over, using the Xanax as you have been in the past  - Detailed, Mod Complex (67081)  - ALPRAZolam 0.25 MG Oral Tab; Take 1 tablet (0.25 mg total) by mouth every 6 (six) hours as needed for Anxiety.  Dispense: 40 tablet; Refill: 1    5. B12 deficiency  B12 injections once weekly for 8 weeks then go to every month after that, self injections  - Urinalysis with Culture Reflex  - cyanocobalamin 1000 MCG/ML Injection Solution; Inject 1 mL (1,000 mcg total) into the muscle once a week.  Dispense: 22 each; Refill: 0  - Syringe 25G X 5/8\" 3 ML Does not apply Misc; Use for IM injections once monthly  Dispense: 50 each; Refill: 0    6. Screening PSA (prostate specific antigen)  Stable cont monitoring and  management  - PSA Total, Screen; Future    7. Lumbar radiculopathy  Lets get some x-rays done, and set up physical therapy with the recommended therapist, and I will notify with results once completed I do like the idea of the call to the physiatry team, setting a specialist visit up in the next 4 to 6 weeks with him as well, he can certainly use any of the available partners if we need  They have taken to the neck step, but I do want a couple of weeks of physical therapy with the preceding x-ray before going to that visit if we can time that properly  - Physical Therapy Referral - [place] Locations  - XR LUMBAR SPINE (MIN 4 VIEWS) (CPT=72110); Future  - Physiatry Referral - In Network    8. Bilateral hearing loss, unspecified hearing loss type  Stable continue current monitoring management here, audiology clinic visit  - Audiology Referral - [facility])    9. Tinnitus of both ears  As above.  - Audiology Referral - [facility])

## 2024-10-12 LAB
SEX HORM BIND GLOB: 47.5 NMOL/L
TESTOST % FREE+WEAK BND: 15.1 %
TESTOST FREE+WEAK BND: 71.9 NG/DL
TESTOSTERONE TOT /MS: 476.1 NG/DL

## 2024-11-06 ENCOUNTER — TELEPHONE (OUTPATIENT)
Dept: PHYSICAL THERAPY | Age: 69
End: 2024-11-06

## 2024-11-06 ENCOUNTER — TELEPHONE (OUTPATIENT)
Dept: PHYSICAL THERAPY | Facility: HOSPITAL | Age: 69
End: 2024-11-06

## 2024-11-09 ENCOUNTER — HOSPITAL ENCOUNTER (OUTPATIENT)
Dept: GENERAL RADIOLOGY | Age: 69
Discharge: HOME | End: 2024-11-09
Attending: INTERNAL MEDICINE
Payer: MEDICARE

## 2024-11-09 DIAGNOSIS — M54.16 LUMBAR RADICULOPATHY: ICD-10-CM

## 2024-11-09 PROCEDURE — 72110 X-RAY EXAM L-2 SPINE 4/>VWS: CPT | Performed by: INTERNAL MEDICINE

## 2024-11-13 ENCOUNTER — TELEPHONE (OUTPATIENT)
Dept: PHYSICAL THERAPY | Facility: HOSPITAL | Age: 69
End: 2024-11-13

## 2024-11-25 ENCOUNTER — TELEPHONE (OUTPATIENT)
Dept: INTERNAL MEDICINE CLINIC | Facility: CLINIC | Age: 69
End: 2024-11-25

## 2024-11-25 DIAGNOSIS — F41.9 ANXIETY: ICD-10-CM

## 2024-11-25 NOTE — TELEPHONE ENCOUNTER
Patient called to let Dr DAmico know the Bupropion is not as effective as the Escitalopram      Patient would like to go back to Escitalopram   now before the holidays     Pharmacy -[place]     Requests call back to confirm [phone]

## 2024-11-25 NOTE — TELEPHONE ENCOUNTER
Noted that would be fine with me, have him restart the Lexapro 10 mg now, and stop the bupropion, he certainly can keep the bupropion leftover if we needed for the future, I do know some people are on both if needed, he should be giving me some update in 1 month on how things are going.  He should have refills, if not nursing task back to me

## 2024-12-02 ENCOUNTER — OFFICE VISIT (OUTPATIENT)
Dept: PHYSICAL MEDICINE AND REHAB | Facility: CLINIC | Age: 69
End: 2024-12-02
Payer: MEDICARE

## 2024-12-02 VITALS — BODY MASS INDEX: 27.64 KG/M2 | HEIGHT: 66 IN | WEIGHT: 172 LBS

## 2024-12-02 DIAGNOSIS — M79.10 MYALGIA: ICD-10-CM

## 2024-12-02 DIAGNOSIS — M51.372 DEGENERATION OF INTERVERTEBRAL DISC OF LUMBOSACRAL REGION WITH DISCOGENIC BACK PAIN AND LOWER EXTREMITY PAIN: ICD-10-CM

## 2024-12-02 DIAGNOSIS — M47.816 LUMBAR SPONDYLOSIS: ICD-10-CM

## 2024-12-02 DIAGNOSIS — M54.16 LUMBAR RADICULOPATHY: Primary | ICD-10-CM

## 2024-12-02 DIAGNOSIS — M51.369 BULGE OF LUMBAR DISC WITHOUT MYELOPATHY: ICD-10-CM

## 2024-12-02 DIAGNOSIS — M54.59 MECHANICAL LOW BACK PAIN: ICD-10-CM

## 2024-12-02 DIAGNOSIS — M47.816 FACET SYNDROME, LUMBAR: ICD-10-CM

## 2024-12-02 DIAGNOSIS — M48.061 LUMBAR FORAMINAL STENOSIS: ICD-10-CM

## 2024-12-02 DIAGNOSIS — M99.9 BIOMECHANICAL LESION: ICD-10-CM

## 2024-12-02 PROCEDURE — 99204 OFFICE O/P NEW MOD 45 MIN: CPT | Performed by: PHYSICAL MEDICINE & REHABILITATION

## 2024-12-02 RX ORDER — NAPROXEN 500 MG/1
500 TABLET ORAL 2 TIMES DAILY WITH MEALS
Qty: 60 TABLET | Refills: 0 | Status: SHIPPED | OUTPATIENT
Start: 2024-12-02

## 2024-12-02 NOTE — H&P
[facility]  Clinic H&P    Requesting Physician: Jeff Anthony D'Amico, DO    Chief Complaint (Reason for Visit):    Chief Complaint   Patient presents with    New Patient     New R handed pt presenting w/ pain in both buttocks due to sciatica for since summer 2024 w/o injury. Pain 3/10. Pain radiates down R leg to top of foot. Pain in L buttock began recently. Admits N/T in R buttock. Admits weakness in both legs. Pt takes ibuprofen as needed with slight improvement. No tx. No prior injections. XR lumbar spine 11/9/24.       History of Present Illness:  The patient is a 69 year old right-handed male with no significant past medical history who presents with right-sided low back pain with radiation to the buttock and down into the anterior tibia and foot.  His symptoms have been ongoing since about the summer without an inciting event.  He rates his discomfort about a 3 out of 10 and is aggravated more so by going up stairs as well as sitting for long periods of time and bending forward to reach something.  His symptoms improved by rest.  He has tried ibuprofen which provides mild improvement.  He has had x-rays of the lumbar spine but no formal physical therapy.  He denies MRIs.  He denies injections.  He denies paresthesias or focal weakness.  He works as an optometrist    PAST MEDICAL HISTORY:   History reviewed. No pertinent past medical history.    PAST SURGICAL HISTORY:   Past Surgical History:   Procedure Laterality Date    Colonoscopy  05/2018        FAMILY HISTORY:   Family History   Problem Relation Age of Onset    Diabetes Mother     Diabetes Brother     Hypertension Brother        SOCIAL HISTORY:   Social History     Occupational History    Not on file   Tobacco Use    Smoking status: Never    Smokeless tobacco: Never   Vaping Use    Vaping status: Never Used   Substance and Sexual Activity    Alcohol use: Yes     Comment: socially    Drug use: No    Sexual  activity: Not on file       CURRENT MEDICATIONS:   Current Outpatient Medications   Medication Sig Dispense Refill    naproxen (NAPROSYN) 500 MG Oral Tab Take 1 tablet (500 mg total) by mouth 2 (two) times daily with meals. Take for 2 weeks as directed and then as needed. 60 tablet 0    levocetirizine 5 MG Oral Tab Take 1 tablet (5 mg total) by mouth every evening.      Multiple Vitamins-Minerals (MENS MULTI VITAMIN & MINERAL) Oral Tab Take by mouth.      magnesium 30 MG Oral Tab Take 1 tablet (30 mg total) by mouth 2 (two) times daily.      Ascorbic Acid (VITAMIN C) 1000 MG Oral Tab Take 1 tablet (1,000 mg total) by mouth daily.      B Complex Vitamins (VITAMIN B COMPLEX OR) Take by mouth.      NON FORMULARY Take 2 capsules by mouth daily. LUTEIN, XANTHINE,      cyanocobalamin 1000 MCG/ML Injection Solution Inject 1 mL (1,000 mcg total) into the muscle once a week. 22 each 0    Syringe 25G X 5/8\" 3 ML Does not apply Misc Use for IM injections once monthly 50 each 0    ALPRAZolam 0.25 MG Oral Tab Take 1 tablet (0.25 mg total) by mouth every 6 (six) hours as needed for Anxiety. 40 tablet 1    ESCITALOPRAM 10 MG Oral Tab TAKE ONE TABLET BY MOUTH ONE TIME DAILY 90 tablet 3    fluticasone propionate 50 MCG/ACT Nasal Suspension 1 spray by Each Nare route daily as needed for Rhinitis.          ALLERGIES:   Allergies[1]      REVIEW OF SYSTEMS:   Review of Systems   Constitutional: Negative.    HENT: Negative.    Eyes: Negative.    Respiratory: Negative.    Cardiovascular: Negative.    Gastrointestinal: Negative.    Genitourinary: Negative.    Musculoskeletal: As per HPI   Skin: Negative.    Neurological: As per HPI  Endo/Heme/Allergies: Negative.    Psychiatric/Behavioral: Negative.      All other systems reviewed and are negative. Pertinent positives and negatives noted in the HPI.        PHYSICAL EXAM:   Ht 66\"   Wt 172 lb (78 kg)   BMI 27.76 kg/m²     Body mass index is 27.76 kg/m².      General: No immediate  distress  Head: Normocephalic/ Atraumatic  Eyes: Extra-occular movements intact.   Ears: No auricular hematoma or deformities  Mouth: No lesions or ulcerations  Heart: peripheral pulses intact. Normal capillary refill.   Lungs: Non-labored respirations  Abdomen: No abdominal guarding  Extremities: No lower extremity edema bilaterally   Skin: No lesions noted.   Cognition: alert & oriented x 3, attentive, able to follow 2 step commands, comprehention intact, spontaneous speech intact  Motor:    Musculoskeletal:    LUMBAR SPINE:  Inspection: no erythema, swelling, or obvious deformity.  Their iliac crest and shoulder heights are symmetrical.  Postural exam reveals no scoliosis or kyphosis.  Palpation: Tender to palpation over the right glued and piriformis  ROM: Limited in extension.  Motor: 5/5 in all myotomes of the BILATERAL lower extremities 4 out of 5 during right great toe extension  Sensation: Intact to light touch in all dermatomes of the lower extremities except decrease sensation to light touch over the right L4 right L5 dermatomes  Reflexes: 3/4 at L4 and S1  Facet Loading: no specific facet pain  AGNES: Negative  Straight leg raise: negative for radicular pain symptoms  Slump test: negative for pain symptoms or radicular pain symptoms      Gait:  Negative    Data  Office Visit on 10/08/2024   Component Date Value Ref Range Status    Ventricular rate 10/08/2024 76  BPM Final    Atrial rate 10/08/2024 76  BPM Final    P-R Interval 10/08/2024 182  ms Final    QRS Duration 10/08/2024 92  ms Final    Q-T Interval 10/08/2024 398  ms Final    QTC Calculation (Bezet) 10/08/2024 447  ms Final    P Axis 10/08/2024 66  degrees Final    R Axis 10/08/2024 26  degrees Final    T Axis 10/08/2024 56  degrees Final   [facility] Lab Encounter on 10/05/2024   Component Date Value Ref Range Status    Testosterone Tot LC/MS 10/05/2024 476.1  264.0 - 916.0 ng/dL Final    Testost % Free+Weak Bnd 10/05/2024 15.1  9.0 - 46.0 % Final     Testost Free+Weak Bnd 10/05/2024 71.9  40.0 - 250.0 ng/dL Final    Sex Horm Bind Glob 10/05/2024 47.5  19.3 - 76.4 nmol/L Final    Glucose 10/05/2024 101 (H)  70 - 99 mg/dL Final    Sodium 10/05/2024 142  136 - 145 mmol/L Final    Potassium 10/05/2024 4.1  3.5 - 5.1 mmol/L Final    Chloride 10/05/2024 109  98 - 112 mmol/L Final    CO2 10/05/2024 25.0  21.0 - 32.0 mmol/L Final    Anion Gap 10/05/2024 8  0 - 18 mmol/L Final    BUN 10/05/2024 13  9 - 23 mg/dL Final    Creatinine 10/05/2024 0.86  0.70 - 1.30 mg/dL Final    BUN/CREA Ratio 10/05/2024 15.1  10.0 - 20.0 Final    Calcium, Total 10/05/2024 9.1  8.7 - 10.4 mg/dL Final    Calculated Osmolality 10/05/2024 294  275 - 295 mOsm/kg Final    eGFR-Cr 10/05/2024 93  >=60 mL/min/1.73m2 Final    ALT 10/05/2024 27  10 - 49 U/L Final    AST 10/05/2024 26  <34 U/L Final    Alkaline Phosphatase 10/05/2024 82  45 - 117 U/L Final    Bilirubin, Total 10/05/2024 1.0  0.2 - 1.1 mg/dL Final    Total Protein 10/05/2024 7.5  5.7 - 8.2 g/dL Final    Albumin 10/05/2024 4.3  3.2 - 4.8 g/dL Final    Globulin  10/05/2024 3.2  2.0 - 3.5 g/dL Final    A/G Ratio 10/05/2024 1.3  1.0 - 2.0 Final    Patient Fasting for CMP? 10/05/2024 Yes   Final    HgbA1C 10/05/2024 5.5  <5.7 % Final    Estimated Average Glucose 10/05/2024 111  68 - 126 mg/dL Final   ]      Radiology Imaging:  I reviewed with the patient his X-ray of the lumbar spine from 11/9/2024  XR LUMBAR SPINE (MIN 4 VIEWS) (CPT=72110)  Narrative: PROCEDURE: XR LUMBAR SPINE (MIN 4 VIEWS) (CPT=72110)     COMPARISON: None.     INDICATIONS: Lower back pain x 2 months. No known injury.     TECHNIQUE: Lumbar spine radiographs (minimum 4 views)       FINDINGS:      ALIGNMENT:   There is straightening of the normal lumbar lordosis.  Trace retrolisthesis of L5 on S1.     VERTEBRAL BODIES:    There is a smooth indentation along the posterior inferior endplate of the L5 vertebral body.  No retropulsion.  vertebral body heights are maintained.  There are flowing ventral osteophytes at multiple consecutive levels, compatible   with diffuse idiopathic skeletal hyperostosis (DISH).  There is diffuse osseous demineralization, which limits sensitivity for detection of osseous pathology.       DISC SPACES:  There is multilevel intervertebral loss of disc height, which is most advanced at L5-S1, where it is moderate.     FACET JOINTS:  Advanced facet arthropathy at L4-5 and L5-S1.     SACROILIAC JOINTS:  Inferior left sacroiliac joint osteophyte formation.  Unremarkable right sacroiliac joint.     OTHER:   There is scattered mild osseous neural foraminal narrowing bilaterally.  There is moderate osseous neural foraminal narrowing on the right at L2-3 and moderate on the left at L1-2, L2-3, and L3-4.                 Impression: CONCLUSION:      Moderate multilevel degenerative disease of the spine, with up to moderate osseous neural foraminal narrowing, as detailed above.     Mild ovoid depression of the inferior endplate of the L5 vertebral body, favored to represent a Schmorl's node.  An age indeterminate mild inferior endplate compression fracture is a less likely differential consideration.  Correlate with symptoms.  No   retropulsion.                 Dictated by (CST): Rose Barahona MD on 11/09/2024 at 9:37 AM       Finalized by (CST): Rose Barahona MD on 11/09/2024 at 9:41 AM              ASSESSMENT AND PLAN:  Johnnie is a pleasant 69-year-old male presents for complaints of right-sided buttock pain with radiation down the right leg.  On exam, he does have weakness during right great toe extension with decree sensation at L4 and L5 dermatomes on the right.  I am recommending he start formal physical therapy, Naprosyn, and Tylenol.  If his symptoms continue, he should be started on gabapentin 100 mg 3 times per day.  He will follow-up with me in about 2 months.  If his symptoms persist, then I would recommend an MRI of the lumbar spine.       RTC in 2  months    Discharge Instructions were provided as documented in AVS summary.  The patient was in agreement with the assessment and plan.  All questions were answered.  There were no barriers to learning.         1. Lumbar radiculopathy    2. Mechanical low back pain    3. Lumbar foraminal stenosis    4. Lumbar spondylosis    5. Degeneration of intervertebral disc of lumbosacral region with discogenic back pain and lower extremity pain    6. Bulge of lumbar disc without myelopathy    7. Facet syndrome, lumbar    8. Myalgia    9. Biomechanical lesion        Alex B. Behar MD, [facility]  Physical Medicine and Rehabilitation/Sports Medicine  [facility] [place]             [1] No Known Allergies

## 2024-12-02 NOTE — PATIENT INSTRUCTIONS
1) Please begin physical therapy as soon as possible.   2) Take Naprosyn 500 mg 1 tablet twice per day with food for the next two weeks and then as needed but no more than 2 tablets per day. Do not take with any other NSAIDS (Ibuprofen, Advil, Aleve, etc). OK to take Tylenol 500 mg every 6 hours as needed for pain. If you develop any side effects including stomach aches, nausea, vomiting, or other gastrointestinal symptoms, stop the medication and call my office.   3) Tylenol 500-1000 mg every 6-8 hours as needed for pain.  No more than 3000 mg daily.  4) If symptoms worsen, then we will start you on gabapentin 100 mg three times per day.   5) Follow-up with me in 6 to 8 weeks after you  begin physical therapy. If symptoms do not improve, then I would recommend an MRI of the Lumbar spine

## 2024-12-10 ENCOUNTER — OFFICE VISIT (OUTPATIENT)
Dept: PHYSICAL THERAPY | Age: 69
End: 2024-12-10
Attending: INTERNAL MEDICINE
Payer: MEDICARE

## 2024-12-10 DIAGNOSIS — M54.16 LUMBAR RADICULOPATHY: Primary | ICD-10-CM

## 2024-12-10 PROCEDURE — 97110 THERAPEUTIC EXERCISES: CPT | Performed by: PHYSICAL THERAPIST

## 2024-12-10 PROCEDURE — 97162 PT EVAL MOD COMPLEX 30 MIN: CPT | Performed by: PHYSICAL THERAPIST

## 2024-12-10 NOTE — PROGRESS NOTES
LUMBAR SPINE EVALUATION:   Referring Physician:  Behar, A. MD    Diagnosis: :  Lumbar radiculopathy (M54.16)  Mechanical low back pain (M54.59)  Lumbar foraminal stenosis (M48.061)  Lumbar spondylosis (M47.816)  Degeneration of intervertebral disc of lumbosacral region with discogenic back pain and lower extremity pain (M51.372)  Bulge of lumbar disc without myelopathy (M51.369)  Facet syndrome, lumbar (M47.816)  Myalgia (M79.10)   Biomechanical lesion (M99.9)   Date of Service: 12/10/2024   Date of Onset: 2 months ago    PATIENT SUMMARY   Johnnie Madden is a 69 year old y/o male who presents to therapy today with complaints of intermittent pain/ache rated 0-3/10 in the (R) lower buttock, posterior thigh, weakness of the (R) knee, and tingling in the (R) anterior shin and between the 1st and 2nd toes. 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  He drives an [employer] for 25-60 mins to/from home/work, to do errands, go to activities, and appointments.      History of current condition: Johnnie report that about 2 months ago he went for a run and then felt a slight pain/ache in the (R) lower buttock. It went away but after that he went up some stairs and then felt the ache in the same place and also in the back of the (R) thigh.       Previous history:  (R) RTC problem 2 years ago, C6 problem and (R) carpal tunnel problem 2 years ago.    Current functional limitations reported include the inability to climb up stairs, sit, rise up from sitting, and run/jog/fast walk without producing or increasing symptoms in the (R) lower buttock, posterior thigh, knee, shin, and between 1st and 2nd toes.     Patient would like to be able to return to their prior level of function     ASSESSMENT:   Johnnie is presenting with a provisional (R) lumbar spine unilateral below knee derangement with a directional preference toward lower  lumbar extension unloaded > loaded.  His lumbar mobility increased and stiffness/tightness in the lower back abolished after this directional preference exercise.  He was not presenting with (R) buttock and LE symptoms at this time but was explained the importance of performing his directional preference exercise consistently to have a lasting effect on his back and (R) LE symptoms.  Discussed the role of posture correction to augment reduced symptoms.   Johnnie Madden will be re-assessed next session and will be progressed or modified according to his presentation.  He understood and agreed with the plan of care.  All questions and concerns were answered and addressed at this time.        Johnnie would benefit from skilled Physical Therapy to address the above impairments.      Precautions: None     OBJECTIVE:   Observation/Posture:   Slouched, kyphotic, forward head, protruded shoulders, reduced lumbar lordosis.   (-) Dural tension on (B) LE.    Gait:   Normal no assistive device.     ROM: (Pre-test symptom: 0/10 back, (R) buttock, LE, foot)  Lumbar        Movement Loss Pain (+/-)   Flexion Nil loss                     NE tight mid back   Extension Minimal loss NE   R Sideglide Minimal loss NE   L Sideglide Minimal loss NE tight back     Repeated motion testing/other tests/Treatment:  LIT x 10 reps; NE.  + 10 more; NE.  Pronelying x 1 min; NE. MEGHAN x 2 mins; NE unloading back.  REIL x 10 reps, P, NW stiff/tight back/neck.  Re-test lumbar AROM: increased to nil loss in all directions without tightness.  Sitting posture correction with lumbar roll.      Today’s Treatment and Response:  Patient instructed and educated on lumbar mechanics, directional preference exercise, centralization of symptoms, goal setting with patient,  posture correction, and HEP.    HEP: Patient was instructed in and issued a HEP for REIL x 10 reps or LIT x 10 reps (if not mat/bed) 4-5x/day.  Pronelying to MEGHAN x 1-2 mins before sleeping  1-2x/day.  Sitting posture correction with lumbar roll (recommended) on a supportive chair at home and while driving.    Charges: PT Eval (Mod) x 1, TherEx x 1    Total Timed treatment: 20 min      Total Treatment Time: 46 min     PLAN OF CARE:    Goals: (12 visits)  1. Patient to consistently perform her HEP and it's progression to maintain her improved condition.   2. Patient to have reduced, centralized, and abolished symptoms in the low back to enable easier ADLs, functional, work, and recreational activities.   3. Patient to have WNL of lumbar mobility in all directions to be able to climb up stairs, sit, rise up from sitting, and run/jog/fast walk without producing or increasing symptoms in the (R) lower buttock, posterior thigh, knee, shin, and between 1st and 2nd toes.   4. Patient to consistently have good posture to promote her symptomfree condition.     Patient was advised of these findings, precautions, and treatment options and has agreed to actively participate in planning/goal setting for this course of care.    Frequency / Duration: Patient will be seen for 2-1x/week or a total of 12 visits over a 90 day period.      Treatment will include: Directional preference exercises; Therapeutic Exercises; Neuromuscular Re-education; Posture correction; Patient education; HEP progression.    Education or treatment limitation: None  Rehab Potential:good    In agreement with functional assessment testing score and clinical rationale, this evaluation involved Moderate Complexity decision making due to 1-2 personal factors/comorbidities, 4+ body structures involved/activity limitations, and evolving symptoms including changing pain levels.    DEDRICK Score: Initial:  12    Patient was advised of these findings, precautions, and treatment options and has agreed to actively participate in planning and for this course of care.    Thank you for your referral. Please co-sign or sign and return this letter via fax as soon  as possible to [phone]. If you have any questions, please contact me at Dept: [phone]    Sincerely,  Electronically signed by therapist: CARLOS Cam. MDT    Physician's certification required: Yes  I certify the need for these services furnished under this plan of treatment and while under my care.    X___________________________________________________ Date____________________    Certification From: 12/10/2024  To:3/10/2025

## 2024-12-10 NOTE — PATIENT INSTRUCTIONS
HEP: Patient was instructed in and issued a HEP for REIL x 10 reps or LIT x 10 reps (if not mat/bed) 4-5x/day.  Pronelying to MEGHAN x 1-2 mins before sleeping 1-2x/day.  Sitting posture correction with lumbar roll (recommended) on a supportive chair at home and while driving.

## 2024-12-12 ENCOUNTER — OFFICE VISIT (OUTPATIENT)
Dept: PHYSICAL THERAPY | Age: 69
End: 2024-12-12
Attending: INTERNAL MEDICINE
Payer: MEDICARE

## 2024-12-12 PROCEDURE — 97110 THERAPEUTIC EXERCISES: CPT | Performed by: PHYSICAL THERAPIST

## 2024-12-12 NOTE — PROGRESS NOTES
Diagnosis:  Lumbar radiculopathy (M54.16)  Mechanical low back pain (M54.59)  Lumbar foraminal stenosis (M48.061)  Lumbar spondylosis (M47.816)  Degeneration of intervertebral disc of lumbosacral region with discogenic back pain and lower extremity pain (M51.372)  Bulge of lumbar disc without myelopathy (M51.369)  Facet syndrome, lumbar (M47.816)  Myalgia (M79.10)   Biomechanical lesion (M99.9)        Referring Provider:  Behar, A. MD Date of Evaluation:  12/10/2024    Precautions:  None Date POC Expires: 3/10/2025     Date of Surgery: NA    Next MD visit:   none scheduled     Today's Date: 12/12/2024    Insurance Primary/Secondary: MEDICARE PART A&B/ [employer] [place] INDEMNITY   Authorized Visits: 12     Visit Number: 2    Charges: TherEx x 3  Total Timed Treatment: 48 min  Total Treatment time: 49 min    Medication Changes since last visit?: No    Subjective:   Johnnie report that he is feeling better compared to the first session.  He has no symptoms in the back and (R) LE and his back is less tight even getting out of his vehicle after a long drive.  He has been doing her HEP (LIT or REIL) regularly.   Objective:   ROM: (Pre-test symptom: 0/10 ache/pain/tingling back, (R) buttock, LE, foot)  Lumbar        Movement Loss Pain (+/-)   Flexion Minimal loss                     NE tight mid back   Extension Nil loss NE   R Sideglide Nil loss NE tight (L) groin   L Sideglide Nil loss NE      Treatment:  Date: 12/12/2024  Tx#: 2/12 Date:   Tx#: 3/ Date:   Tx#: 4/ Date:   Tx#: 5/ Date:   Tx#: 6/ Date:   Tx#: 7/ Date:   Tx#: 8/   Scifit UE only L4 fwd/bkwd x 5 mins ea; NE postural training with lumbar roll    LIT x 10 reps; NE    - less tightness lower back lumbar flexion    LIT over rail x 10 reps; NE     - no tightness lower back flexion and increase to flexion    Pronelying x 1 min; NE    REIL sag x 10 (8+2) reps; NE (more pressure)    + belt OP x 10 reps; NE (pressure back only)    Sustained lumbar extension with belt OP at  pelvis using a table/mat x 3 mins; NE pressure back    REIL with sheet OP x 10 reps; NE    Sitting posture correction with lumbar roll; P, NW (R) foot tingling     - scoot forward 1 inch; Dec, A           Assessment:   Johnnie continue to respond to lower lumbar extension directional preference exercise with overpressure.  He did not report any pain/ache/tightness in the lower back and (R) LE and his lumbar mobility increased to nil loss in all directions after this directional preference progression.  Reinforced posture correction with a lumbar roll.  He understood and agreed with the treatment plan.  All questions and concerns were answered and addressed at this time.     HEP:    - LIT over rail x 10 reps 2-3x/day or  - REIL with sag or sheet OP x 10 reps 2-3x/day  - Sitting posture correction with lumbar roll    Goals:    (12 visits)  1. Patient to consistently perform her HEP and it's progression to maintain her improved condition.   2. Patient to have reduced, centralized, and abolished symptoms in the low back to enable easier ADLs, functional, work, and recreational activities.   3. Patient to have WNL of lumbar mobility in all directions to be able to climb up stairs, sit, rise up from sitting, and run/jog/fast walk without producing or increasing symptoms in the (R) lower buttock, posterior thigh, knee, shin, and between 1st and 2nd toes.   4. Patient to consistently have good posture to promote her symptomfree condition.     Plan:   Re-assess next session and continue with directional preference exercise, posture correction, patient education, and HEP progression.

## 2024-12-17 ENCOUNTER — OFFICE VISIT (OUTPATIENT)
Dept: PHYSICAL THERAPY | Age: 69
End: 2024-12-17
Attending: INTERNAL MEDICINE
Payer: MEDICARE

## 2024-12-17 PROCEDURE — 97110 THERAPEUTIC EXERCISES: CPT | Performed by: PHYSICAL THERAPIST

## 2024-12-17 NOTE — PROGRESS NOTES
Diagnosis:  Lumbar radiculopathy (M54.16)  Mechanical low back pain (M54.59)  Lumbar foraminal stenosis (M48.061)  Lumbar spondylosis (M47.816)  Degeneration of intervertebral disc of lumbosacral region with discogenic back pain and lower extremity pain (M51.372)  Bulge of lumbar disc without myelopathy (M51.369)  Facet syndrome, lumbar (M47.816)  Myalgia (M79.10)   Biomechanical lesion (M99.9)        Referring Provider:  Behar, A. MD Date of Evaluation:  12/10/2024    Precautions:  None Date POC Expires: 3/10/2025     Date of Surgery: NA    Next MD visit:   none scheduled     Today's Date: 12/17/2024    Insurance Primary/Secondary: MEDICARE PART A&B/ [employer] [place]DEMN[place]   Authorized Visits: 12     Visit Number: 3    Charges: TherEx x 4  Total Timed Treatment: 58 min  Total Treatment time: 60 min    Medication Changes since last visit?: No    Subjective:   Johnnie report that he is feeling better in the back.  He was able to get out of his vehicle without pain/ache in the back.  He has been doing his HEP (REIL and LIT) regularly.       Objective:   ROM: (Pre-test symptom: 0/10 ache/pain/tingling back, (R) buttock, LE, foot)  Lumbar        Movement Loss Pain (+/-)   Flexion Nil loss                     NE    Extension Nil loss NE   R Sideglide Nil loss NE   L Sideglide Nil loss NE      Treatment:  Date: 12/12/2024  Tx#: 2/12 Date: 12/17/2024  Tx#: 3/12 Date:   Tx#: 4/ Date:   Tx#: 5/ Date:   Tx#: 6/ Date:   Tx#: 7/ Date:   Tx#: 8/   Scifit UE only L4 fwd/bkwd x 5 mins ea; NE postural training with lumbar roll    LIT x 10 reps; NE    - less tightness lower back lumbar flexion    LIT over rail x 10 reps; NE     - no tightness lower back flexion and increase to flexion    Pronelying x 1 min; NE    REIL sag x 10 (8+2) reps; NE (more pressure)    + belt OP x 10 reps; NE (pressure back only)    Sustained lumbar extension with belt OP at pelvis using a table/mat x 3 mins; NE pressure back    REIL with sheet OP x 10 reps;  NE    Sitting posture correction with lumbar roll; P, NW (R) foot tingling     - scoot forward 1 inch; Dec, A Scifit UE only L4 fwd/bkwd x 5 mins ea; NE postural training with lumbar roll    LIT hands on wall x 10 reps; NE    Pronelying x 1 min; NE    REIL with manual OP x 10 reps; NE pressure in back only    Prone: Alt LE lift 2 x 10 reps; NE tired    Prone: (B) UE extension 10 reps x 10 cts ea; NE tired    Prone: (B) UE h.abduction 10 reps x 10 cts ea; NE tired    Prone: (B) UE scaption 10 reps x 10 cts ea; NE tired    Prone: back extension (skydiver) 10 reps x 10 cts ea; NE tired    Hydrant (R/L) 1kg ball 5 reps x 10 cts ea; NE      Slouch-over correct x 10 reps; P, NW (R) posterior thigh    Monster walk fwd/bkwd with redTB abduction resist 3 laps x 30 feet; NE tired    LIT over rail x 10 reps; NE          Assessment:   Johnnie continue to respond to lower lumbar extension directional preference exercise with overpressure.  He has WNL of lumbar AROM in all directions without production of symptoms.  Added postural strengthening/stability exercises in prone and standing positions.  Cueing required to maintain good posture, alignment, and exercise technique.  All questions and concerns were answered and addressed at this time.     HEP:    - LIT over rail or hands on wall x 10 reps 2-3x/day or  - REIL with sag or sheet OP x 10 reps 2-3x/day  - Sitting posture correction with lumbar roll    Goals:    (12 visits)  1. Patient to consistently perform her HEP and it's progression to maintain her improved condition.   2. Patient to have reduced, centralized, and abolished symptoms in the low back to enable easier ADLs, functional, work, and recreational activities.   3. Patient to have WNL of lumbar mobility in all directions to be able to climb up stairs, sit, rise up from sitting, and run/jog/fast walk without producing or increasing symptoms in the (R) lower buttock, posterior thigh, knee, shin, and between 1st and 2nd  toes.   4. Patient to consistently have good posture to promote her symptomfree condition.     Plan:   Re-assess next session and continue with directional preference exercise, posture correction, patient education, and HEP progression.

## 2024-12-19 ENCOUNTER — OFFICE VISIT (OUTPATIENT)
Dept: PHYSICAL THERAPY | Age: 69
End: 2024-12-19
Attending: INTERNAL MEDICINE
Payer: MEDICARE

## 2024-12-19 ENCOUNTER — APPOINTMENT (OUTPATIENT)
Dept: PHYSICAL THERAPY | Age: 69
End: 2024-12-19
Attending: INTERNAL MEDICINE
Payer: MEDICARE

## 2024-12-19 PROCEDURE — 97110 THERAPEUTIC EXERCISES: CPT | Performed by: PHYSICAL THERAPIST

## 2024-12-19 NOTE — PROGRESS NOTES
Diagnosis:  Lumbar radiculopathy (M54.16)  Mechanical low back pain (M54.59)  Lumbar foraminal stenosis (M48.061)  Lumbar spondylosis (M47.816)  Degeneration of intervertebral disc of lumbosacral region with discogenic back pain and lower extremity pain (M51.372)  Bulge of lumbar disc without myelopathy (M51.369)  Facet syndrome, lumbar (M47.816)  Myalgia (M79.10)   Biomechanical lesion (M99.9)        Referring Provider:  Behar, A. MD Date of Evaluation:  12/10/2024    Precautions:  None Date POC Expires: 3/10/2025     Date of Surgery: NA    Next MD visit:   none scheduled     Today's Date: 12/19/2024    Insurance Primary/Secondary: MEDICARE PART A&B/ [employer] [place]DEMN[place]   Authorized Visits: 12     Visit Number: 4    Charges: TherEx x 3  Total Timed Treatment: 47 min  Total Treatment time: 48 min    Medication Changes since last visit?: No    Subjective:   Johnnie report that he does not have pain in the back at this time.  He is also sleeping good now without any back issues but upon waking up in the morning he would still feel an ache in the (R) lower back to the thigh rated 0-2/10 and goes away easily upon getting up and moving around. He has been doing her HEP (LIT or REIL) regularly. He is also using a lumbar roll when sitting.      Objective:   ROM: (Pre-test symptom: 0/10 ache/pain/tingling back, (R) buttock, LE, foot)  Lumbar        Movement Loss Pain (+/-)   Flexion Nil loss                     NE    Extension Nil loss NE   R Sideglide Nil loss NE   L Sideglide Nil loss NE      Treatment:  Date: 12/12/2024  Tx#: 2/12 Date: 12/17/2024  Tx#: 3/12 Date: 12/19/2024  Tx#: 4/12 Date:   Tx#: 5/ Date:   Tx#: 6/   Scifit UE only L4 fwd/bkwd x 5 mins ea; NE postural training with lumbar roll    LIT x 10 reps; NE    - less tightness lower back lumbar flexion    LIT over rail x 10 reps; NE     - no tightness lower back flexion and increase to flexion    Pronelying x 1 min; NE    REIL sag x 10 (8+2) reps; NE (more  pressure)    + belt OP x 10 reps; NE (pressure back only)    Sustained lumbar extension with belt OP at pelvis using a table/mat x 3 mins; NE pressure back    REIL with sheet OP x 10 reps; NE    Sitting posture correction with lumbar roll; P, NW (R) foot tingling     - scoot forward 1 inch; Dec, A Scifit UE only L4 fwd/bkwd x 5 mins ea; NE postural training with lumbar roll    LIT hands on wall x 10 reps; NE    Pronelying x 1 min; NE    REIL with manual OP x 10 reps; NE pressure in back only    Prone: Alt LE lift 2 x 10 reps; NE tired    Prone: (B) UE extension 10 reps x 10 cts ea; NE tired    Prone: (B) UE h.abduction 10 reps x 10 cts ea; NE tired    Prone: (B) UE scaption 10 reps x 10 cts ea; NE tired    Prone: back extension (skydiver) 10 reps x 10 cts ea; NE tired    Hydrant (R/L) 1kg ball 5 reps x 10 cts ea; NE      Slouch-over correct x 10 reps; P, NW (R) posterior thigh    Monster walk fwd/bkwd with redTB abduction resist 3 laps x 30 feet; NE tired    LIT over rail x 10 reps; NE TM: Retro walk @ 5% grade x 1.0 mph x 10 mins; NE postural training    LIT hands on wall x 10 reps; NE    Prone: Alt LE lift 3 lbs 2 x 10 reps; NE tired    Prone: (B) UE extension 2 lbs 10 reps x 10 cts ea; NE tired    Prone: (B) UE h.abduction 1 lb 10 reps x 10 cts ea; NE tired    Prone: (B) UE scaption 1 lb 10 reps x 10 cts ea; NE tired    Prone: back extension (skydiver) 10 reps x 10 cts ea; NE tired    Prone: planks on toes and forearms 5 reps x 15 secs ea; NE tired    Monster walk fwd/bkwd with blueTB abduction resist 2 laps x 30 feet; NE    LIT hands on wall x 5 reps; NE         Assessment:   Johnnie continue to respond to lower lumbar extension directional preference exercise with overpressure.  He has WNL of lumbar AROM in all directions without production of symptoms.   He was tired after his session but no pain/ache produced.  All questions and concerns were answered and addressed at this time.     HEP:    - LIT over rail  or hands on wall x 10 reps 2-3x/day or  - REIL with sag or sheet OP x 10 reps 2-3x/day  - Sitting posture correction with lumbar roll    Goals:    (12 visits)  1. Patient to consistently perform her HEP and it's progression to maintain her improved condition.   2. Patient to have reduced, centralized, and abolished symptoms in the low back to enable easier ADLs, functional, work, and recreational activities.   3. Patient to have WNL of lumbar mobility in all directions to be able to climb up stairs, sit, rise up from sitting, and run/jog/fast walk without producing or increasing symptoms in the (R) lower buttock, posterior thigh, knee, shin, and between 1st and 2nd toes.   4. Patient to consistently have good posture to promote her symptomfree condition.     Plan:   Re-assess next session and continue with directional preference exercise, posture correction, patient education, and HEP progression.

## 2024-12-24 ENCOUNTER — APPOINTMENT (OUTPATIENT)
Dept: PHYSICAL THERAPY | Age: 69
End: 2024-12-24
Attending: INTERNAL MEDICINE
Payer: MEDICARE

## 2024-12-25 DIAGNOSIS — F41.9 ANXIETY: ICD-10-CM

## 2024-12-26 ENCOUNTER — APPOINTMENT (OUTPATIENT)
Dept: PHYSICAL THERAPY | Age: 69
End: 2024-12-26
Attending: INTERNAL MEDICINE
Payer: MEDICARE

## 2024-12-27 ENCOUNTER — OFFICE VISIT (OUTPATIENT)
Dept: PHYSICAL THERAPY | Age: 69
End: 2024-12-27
Attending: INTERNAL MEDICINE
Payer: MEDICARE

## 2024-12-27 PROCEDURE — 97110 THERAPEUTIC EXERCISES: CPT | Performed by: PHYSICAL THERAPIST

## 2024-12-27 NOTE — PROGRESS NOTES
Diagnosis:  Lumbar radiculopathy (M54.16)  Mechanical low back pain (M54.59)  Lumbar foraminal stenosis (M48.061)  Lumbar spondylosis (M47.816)  Degeneration of intervertebral disc of lumbosacral region with discogenic back pain and lower extremity pain (M51.372)  Bulge of lumbar disc without myelopathy (M51.369)  Facet syndrome, lumbar (M47.816)  Myalgia (M79.10)   Biomechanical lesion (M99.9)        Referring Provider:  Behar, A. MD Date of Evaluation:  12/10/2024    Precautions:  None Date POC Expires: 3/10/2025     Date of Surgery: NA    Next MD visit:   none scheduled     Today's Date: 12/27/2024    Insurance Primary/Secondary: MEDICARE PART A&B/ [employer] [place]DEMN[place]   Authorized Visits: 12     Visit Number: 5    Charges: TherEx x 3  Total Timed Treatment: 46 min  Total Treatment time: 48 min    Medication Changes since last visit?: No    Subjective:   Johnnie report that he was swinging an axe over the weekend and felt some discomfort in the (R) lower back and leg afterward.  It is better today but his back is not a good as it was last session.  He continue to do his HEP (REIL/LIT) regularly.     Objective:   ROM: (Pre-test symptom: 0-1/10 (R) buttock/thigh)  Lumbar        Movement Loss Pain (+/-)   Flexion Nil loss                     NE    Extension Nil loss Dec, B   R Sideglide Nil loss NE   L Sideglide Nil loss NE      Treatment:  Date: 12/12/2024  Tx#: 2/12 Date: 12/17/2024  Tx#: 3/12 Date: 12/19/2024  Tx#: 4/12 Date: 12/27/2024  Tx#: 5/12 Date:   Tx#: 6/   Scifit UE only L4 fwd/bkwd x 5 mins ea; NE postural training with lumbar roll    LIT x 10 reps; NE    - less tightness lower back lumbar flexion    LIT over rail x 10 reps; NE     - no tightness lower back flexion and increase to flexion    Pronelying x 1 min; NE    REIL sag x 10 (8+2) reps; NE (more pressure)    + belt OP x 10 reps; NE (pressure back only)    Sustained lumbar extension with belt OP at pelvis using a table/mat x 3 mins; NE pressure  back    REIL with sheet OP x 10 reps; NE    Sitting posture correction with lumbar roll; P, NW (R) foot tingling     - scoot forward 1 inch; Dec, A Scifit UE only L4 fwd/bkwd x 5 mins ea; NE postural training with lumbar roll    LIT hands on wall x 10 reps; NE    Pronelying x 1 min; NE    REIL with manual OP x 10 reps; NE pressure in back only    Prone: Alt LE lift 2 x 10 reps; NE tired    Prone: (B) UE extension 10 reps x 10 cts ea; NE tired    Prone: (B) UE h.abduction 10 reps x 10 cts ea; NE tired    Prone: (B) UE scaption 10 reps x 10 cts ea; NE tired    Prone: back extension (skydiver) 10 reps x 10 cts ea; NE tired    Hydrant (R/L) 1kg ball 5 reps x 10 cts ea; NE      Slouch-over correct x 10 reps; P, NW (R) posterior thigh    Monster walk fwd/bkwd with redTB abduction resist 3 laps x 30 feet; NE tired    LIT over rail x 10 reps; NE TM: Retro walk @ 5% grade x 1.0 mph x 10 mins; NE postural training    LIT hands on wall x 10 reps; NE    Prone: Alt LE lift 3 lbs 2 x 10 reps; NE tired    Prone: (B) UE extension 2 lbs 10 reps x 10 cts ea; NE tired    Prone: (B) UE h.abduction 1 lb 10 reps x 10 cts ea; NE tired    Prone: (B) UE scaption 1 lb 10 reps x 10 cts ea; NE tired    Prone: back extension (skydiver) 10 reps x 10 cts ea; NE tired    Prone: planks on toes and forearms 5 reps x 15 secs ea; NE tired    Monster walk fwd/bkwd with blueTB abduction resist 2 laps x 30 feet; NE    LIT hands on wall x 5 reps; NE   TM: Retro walk @ 8% grade x 1.2 mph x 10 mins; NE postural training    LIT hands on wall x 10 reps; NE    Prone: sustained lumbar extension with belt OP at pelvis using mat/table x 3+3 mins; NE    REIL with belt OP at pelvis x 10 reps; NE    Prone: (B) UE rhtymic-stab extension 1 lb 10 sets x 10 bounces ea; NE    Prone: (B) UE rhytmic-stab h.abduction 0 wt 10 sets x 10 bounces ea; NE     Prone: (B)UE rhytmic-stab scaption 0 wt 10 set x 10 bounces ea; NE    LIT hands on wall x 10 reps; NE           Assessment:   Johnnie continue to respond to lower lumbar extension directional preference exercise with overpressure.  He is slowly progressing with postural strengthening/stability exercises.  Reinforced posture correction and consistency with his HEP.  All questions and concerns were answered and addressed at this time.     HEP:    - LIT over rail or hands on wall x 10 reps 2-3x/day or  - REIL with sag or sheet OP x 10 reps 2-3x/day  - Sitting posture correction with lumbar roll    Goals:    (12 visits)  1. Patient to consistently perform her HEP and it's progression to maintain her improved condition.   2. Patient to have reduced, centralized, and abolished symptoms in the low back to enable easier ADLs, functional, work, and recreational activities.   3. Patient to have WNL of lumbar mobility in all directions to be able to climb up stairs, sit, rise up from sitting, and run/jog/fast walk without producing or increasing symptoms in the (R) lower buttock, posterior thigh, knee, shin, and between 1st and 2nd toes.   4. Patient to consistently have good posture to promote her symptomfree condition.     Plan:   Re-assess next session and continue with directional preference exercise, posture correction, patient education, and HEP progression.

## 2024-12-30 NOTE — TELEPHONE ENCOUNTER
Patient received certified letter to call the office     States he prefers to go back to Lexapro/escitalopram     Transferred call to RN

## 2025-01-02 RX ORDER — ESCITALOPRAM OXALATE 10 MG/1
10 TABLET ORAL DAILY
Qty: 90 TABLET | Refills: 1 | Status: SHIPPED | OUTPATIENT
Start: 2025-01-02

## 2025-01-02 RX ORDER — ALPRAZOLAM 0.25 MG/1
0.25 TABLET ORAL EVERY 6 HOURS PRN
Qty: 40 TABLET | Refills: 1 | Status: SHIPPED | OUTPATIENT
Start: 2025-01-02

## 2025-01-03 ENCOUNTER — OFFICE VISIT (OUTPATIENT)
Dept: PHYSICAL THERAPY | Age: 70
End: 2025-01-03
Attending: INTERNAL MEDICINE
Payer: MEDICARE

## 2025-01-03 PROCEDURE — 97110 THERAPEUTIC EXERCISES: CPT | Performed by: PHYSICAL THERAPIST

## 2025-01-03 NOTE — PROGRESS NOTES
Diagnosis:  Lumbar radiculopathy (M54.16)  Mechanical low back pain (M54.59)  Lumbar foraminal stenosis (M48.061)  Lumbar spondylosis (M47.816)  Degeneration of intervertebral disc of lumbosacral region with discogenic back pain and lower extremity pain (M51.372)  Bulge of lumbar disc without myelopathy (M51.369)  Facet syndrome, lumbar (M47.816)  Myalgia (M79.10)   Biomechanical lesion (M99.9)        Referring Provider:  Behar, A. MD Date of Evaluation:  12/10/2024    Precautions:  None Date POC Expires: 3/10/2025     Date of Surgery: NA    Next MD visit:   none scheduled     Today's Date: 1/3/2025    Insurance Primary/Secondary: MEDICARE PART A&B/ [employer] [place] INDEMN[place]   Authorized Visits: 12     Visit Number: 6    Charges: TherEx x 3  Total Timed Treatment: 45 min  Total Treatment time: 46 min    Medication Changes since last visit?: No    Subjective:   Johnnie report that he feels better in the back but he would still feel some tingling and weakness in the (R) thigh/leg climbing up/down stairs.  Otherwise he is doing better.  He has been doing his HEP (LIT or REIL) about 3x/day and he has been using a lumbar roll when sitting and driving.    Objective:   ROM: (Pre-test symptom: 0-1/10 (R) buttock/thigh)  Lumbar        Movement Loss Pain (+/-)   Flexion Nil loss                     NE     Extension Nil loss NE   R Sideglide Nil loss NE   L Sideglide Nil loss NE      Treatment:  Date: 12/12/2024  Tx#: 2/12 Date: 12/17/2024  Tx#: 3/12 Date: 12/19/2024  Tx#: 4/12 Date: 12/27/2024  Tx#: 5/12 Date: 1/3/2025  Tx#: 6/12   Scifit UE only L4 fwd/bkwd x 5 mins ea; NE postural training with lumbar roll    LIT x 10 reps; NE    - less tightness lower back lumbar flexion    LIT over rail x 10 reps; NE     - no tightness lower back flexion and increase to flexion    Pronelying x 1 min; NE    REIL sag x 10 (8+2) reps; NE (more pressure)    + belt OP x 10 reps; NE (pressure back only)    Sustained lumbar extension with belt OP at  pelvis using a table/mat x 3 mins; NE pressure back    REIL with sheet OP x 10 reps; NE    Sitting posture correction with lumbar roll; P, NW (R) foot tingling     - scoot forward 1 inch; Dec, A Scifit UE only L4 fwd/bkwd x 5 mins ea; NE postural training with lumbar roll    LIT hands on wall x 10 reps; NE    Pronelying x 1 min; NE    REIL with manual OP x 10 reps; NE pressure in back only    Prone: Alt LE lift 2 x 10 reps; NE tired    Prone: (B) UE extension 10 reps x 10 cts ea; NE tired    Prone: (B) UE h.abduction 10 reps x 10 cts ea; NE tired    Prone: (B) UE scaption 10 reps x 10 cts ea; NE tired    Prone: back extension (skydiver) 10 reps x 10 cts ea; NE tired    Hydrant (R/L) 1kg ball 5 reps x 10 cts ea; NE      Slouch-over correct x 10 reps; P, NW (R) posterior thigh    Monster walk fwd/bkwd with redTB abduction resist 3 laps x 30 feet; NE tired    LIT over rail x 10 reps; NE TM: Retro walk @ 5% grade x 1.0 mph x 10 mins; NE postural training    LIT hands on wall x 10 reps; NE    Prone: Alt LE lift 3 lbs 2 x 10 reps; NE tired    Prone: (B) UE extension 2 lbs 10 reps x 10 cts ea; NE tired    Prone: (B) UE h.abduction 1 lb 10 reps x 10 cts ea; NE tired    Prone: (B) UE scaption 1 lb 10 reps x 10 cts ea; NE tired    Prone: back extension (skydiver) 10 reps x 10 cts ea; NE tired    Prone: planks on toes and forearms 5 reps x 15 secs ea; NE tired    Monster walk fwd/bkwd with blueTB abduction resist 2 laps x 30 feet; NE    LIT hands on wall x 5 reps; NE   TM: Retro walk @ 8% grade x 1.2 mph x 10 mins; NE postural training    LIT hands on wall x 10 reps; NE    Prone: sustained lumbar extension with belt OP at pelvis using mat/table x 3+3 mins; NE    REIL with belt OP at pelvis x 10 reps; NE    Prone: (B) UE rhtymic-stab extension 1 lb 10 sets x 10 bounces ea; NE    Prone: (B) UE rhytmic-stab h.abduction 0 wt 10 sets x 10 bounces ea; NE     Prone: (B)UE rhytmic-stab scaption 0 wt 10 set x 10 bounces ea;  NE    LIT hands on wall x 10 reps; NE   TM: Retro walk @ 12% grade x 1.2 mph x 10 mins; NE postural training    LIT over rail x 10 reps; NE    (R) SGIS shoulder on wall 2 x 10 reps; P, NW (better)     + extension x 10 (8+2) reps; P, NW      (R) Flex-rotate 2 sets x 10 reps x 10 cts; P, NW (better)    + (higher knees)10 more; NE    Supine and SLY: abdominal, oblique, and TA crunches x 10 reps ea; NE tired    Supine: (R) Flex-rotate 10 reps x 10 cts ea; NE       Assessment:   Johnnie was modified to do a (R) lateral shift directional preference in supinelying as his new HEP.  All questions and concerns were answered and addressed at this time.     HEP:    - Supine: (R) Flex-rotate 10 reps x 10 cts 2-3x/day  - Sitting posture correction with lumbar roll    Goals:    (12 visits)  1. Patient to consistently perform her HEP and it's progression to maintain her improved condition.   2. Patient to have reduced, centralized, and abolished symptoms in the low back to enable easier ADLs, functional, work, and recreational activities.   3. Patient to have WNL of lumbar mobility in all directions to be able to climb up stairs, sit, rise up from sitting, and run/jog/fast walk without producing or increasing symptoms in the (R) lower buttock, posterior thigh, knee, shin, and between 1st and 2nd toes.   4. Patient to consistently have good posture to promote her symptomfree condition.     Plan:   Re-assess next session and continue with directional preference exercise, posture correction, patient education, and HEP progression.

## 2025-01-10 ENCOUNTER — APPOINTMENT (OUTPATIENT)
Dept: PHYSICAL THERAPY | Age: 70
End: 2025-01-10
Attending: INTERNAL MEDICINE
Payer: MEDICARE

## 2025-01-24 RX ORDER — ESCITALOPRAM OXALATE 10 MG/1
10 TABLET ORAL DAILY
Qty: 90 TABLET | Refills: 0 | OUTPATIENT
Start: 2025-01-24

## 2025-01-24 NOTE — TELEPHONE ENCOUNTER
Rx was sent in by md already     Current refill request refused due to refill is either a duplicate request or has active refills at the pharmacy.  Check previous templates.    Requested Prescriptions     Pending Prescriptions Disp Refills    ESCITALOPRAM 10 MG Oral Tab [Pharmacy Med Name: Escitalopram Oxalate 10 Mg Tab Auro] 90 tablet 0     Sig: TAKE ONE TABLET BY MOUTH ONE TIME DAILY

## 2025-01-29 ENCOUNTER — OFFICE VISIT (OUTPATIENT)
Dept: PHYSICAL THERAPY | Age: 70
End: 2025-01-29
Attending: INTERNAL MEDICINE
Payer: MEDICARE

## 2025-01-29 PROCEDURE — 97110 THERAPEUTIC EXERCISES: CPT | Performed by: PHYSICAL THERAPIST

## 2025-01-29 NOTE — PROGRESS NOTES
Diagnosis:  Lumbar radiculopathy (M54.16)  Mechanical low back pain (M54.59)  Lumbar foraminal stenosis (M48.061)  Lumbar spondylosis (M47.816)  Degeneration of intervertebral disc of lumbosacral region with discogenic back pain and lower extremity pain (M51.372)  Bulge of lumbar disc without myelopathy (M51.369)  Facet syndrome, lumbar (M47.816)  Myalgia (M79.10)   Biomechanical lesion (M99.9)        Referring Provider:  Behar, A. MD Date of Evaluation:  12/10/2024    Precautions:  None Date POC Expires: 3/10/2025     Date of Surgery: NA    Next MD visit:   none scheduled     Today's Date: 1/29/2025    Insurance Primary/Secondary: MEDICARE PART A&B/ [employer] [place]DEMN[place]   Authorized Visits: 12     Visit Number: 7    Charges: TherEx x 3  Total Timed Treatment: 44 min  Total Treatment time: 45 min    Medication Changes since last visit?: No    Subjective:   Johnnie report that he feels good in the back.  He has been doing her HEP ((R) Flex-rotate) regularly 3x/day since he was last here. He just got back from a vacation in [place].  He exercised and kept up with his good posture.  He noticed that he has difficulty going up/down stairs because of weakness but no pain/ache.  He is trying to get back to a more normal/better posture while doing all of activities (ie stair climbing, standing) and he is still getting used to doing it this way.  He wants to build more confidence in performing these normal daily activities.     Objective:   ROM: (Pre-test symptom: 0/10 (R) buttock/thigh)  Lumbar        Movement Loss Pain (+/-)   Flexion Nil loss                     NE     Extension Nil loss NE   R Sideglide Nil loss NE   L Sideglide Nil loss NE      Treatment:  Date: 12/12/2024  Tx#: 2/12 Date: 12/17/2024  Tx#: 3/12 Date: 12/19/2024  Tx#: 4/12 Date: 12/27/2024  Tx#: 5/12 Date: 1/3/2025  Tx#: 6/12   Scifit UE only L4 fwd/bkwd x 5 mins ea; NE postural training with lumbar roll    LIT x 10 reps; NE    - less tightness lower back  lumbar flexion    LIT over rail x 10 reps; NE     - no tightness lower back flexion and increase to flexion    Pronelying x 1 min; NE    REIL sag x 10 (8+2) reps; NE (more pressure)    + belt OP x 10 reps; NE (pressure back only)    Sustained lumbar extension with belt OP at pelvis using a table/mat x 3 mins; NE pressure back    REIL with sheet OP x 10 reps; NE    Sitting posture correction with lumbar roll; P, NW (R) foot tingling     - scoot forward 1 inch; Dec, A Scifit UE only L4 fwd/bkwd x 5 mins ea; NE postural training with lumbar roll    LIT hands on wall x 10 reps; NE    Pronelying x 1 min; NE    REIL with manual OP x 10 reps; NE pressure in back only    Prone: Alt LE lift 2 x 10 reps; NE tired    Prone: (B) UE extension 10 reps x 10 cts ea; NE tired    Prone: (B) UE h.abduction 10 reps x 10 cts ea; NE tired    Prone: (B) UE scaption 10 reps x 10 cts ea; NE tired    Prone: back extension (skydiver) 10 reps x 10 cts ea; NE tired    Hydrant (R/L) 1kg ball 5 reps x 10 cts ea; NE      Slouch-over correct x 10 reps; P, NW (R) posterior thigh    Monster walk fwd/bkwd with redTB abduction resist 3 laps x 30 feet; NE tired    LIT over rail x 10 reps; NE TM: Retro walk @ 5% grade x 1.0 mph x 10 mins; NE postural training    LIT hands on wall x 10 reps; NE    Prone: Alt LE lift 3 lbs 2 x 10 reps; NE tired    Prone: (B) UE extension 2 lbs 10 reps x 10 cts ea; NE tired    Prone: (B) UE h.abduction 1 lb 10 reps x 10 cts ea; NE tired    Prone: (B) UE scaption 1 lb 10 reps x 10 cts ea; NE tired    Prone: back extension (skydiver) 10 reps x 10 cts ea; NE tired    Prone: planks on toes and forearms 5 reps x 15 secs ea; NE tired    Monster walk fwd/bkwd with blueTB abduction resist 2 laps x 30 feet; NE    LIT hands on wall x 5 reps; NE   TM: Retro walk @ 8% grade x 1.2 mph x 10 mins; NE postural training    LIT hands on wall x 10 reps; NE    Prone: sustained lumbar extension with belt OP at pelvis using mat/table x 3+3  mins; NE    REIL with belt OP at pelvis x 10 reps; NE    Prone: (B) UE rhtymic-stab extension 1 lb 10 sets x 10 bounces ea; NE    Prone: (B) UE rhytmic-stab h.abduction 0 wt 10 sets x 10 bounces ea; NE     Prone: (B)UE rhytmic-stab scaption 0 wt 10 set x 10 bounces ea; NE    LIT hands on wall x 10 reps; NE   TM: Retro walk @ 12% grade x 1.2 mph x 10 mins; NE postural training    LIT over rail x 10 reps; NE    (R) SGIS shoulder on wall 2 x 10 reps; P, NW (better)     + extension x 10 (8+2) reps; P, NW      (R) Flex-rotate 2 sets x 10 reps x 10 cts; P, NW (better)    + (higher knees)10 more; NE    Supine and SLY: abdominal, oblique, and TA crunches x 10 reps ea; NE tired    Supine: (R) Flex-rotate 10 reps x 10 cts ea; NE       Date: 1/29/2025  Tx#: 7/12       TM: Retro walk @ 12% grade x 1.2 mph x 10 mins; NE postural training    (R) Flex-rotate 10 reps x 10 cts ea; NE     + Manual OP 5 reps x 10 cts ea; NE (more stretch)    Prone: planks on forearm and toes 5 reps x 10 cts ea; NE tired    Hydrant (R/L) LE 1 kg ball 5 reps x 10 cts ea; NE    Step up forward/straddle onto a 6 inch step with a greenTB abduction resist (R/L) LE lead x 20 reps ea; NE    Shuttle: (R/L) LE leg press 8b x 20 reps ea; NE    (R) Flex-rotate x 5 reps x 10 cts ea; NE         Assessment:   Johnnie continue to respond to a lumbar (R) lateral shift in supine ((R) flex-rotate).  He has WNL of lumbar AROM in all directions without symptoms produced in the back.  He is slowly progressing with (B) LE strengthening/stability exercises.  Cueing needed to correct alignment, form, and technique.  All questions and concerns were answered and addressed at this time.     HEP:    - Supine: (R) Flex-rotate 10 reps x 10 cts 2-3x/day  - Sitting posture correction with lumbar roll    Goals:    (12 visits)  1. Patient to consistently perform her HEP and it's progression to maintain her improved condition.   2. Patient to have reduced, centralized, and abolished  symptoms in the low back to enable easier ADLs, functional, work, and recreational activities.   3. Patient to have WNL of lumbar mobility in all directions to be able to climb up stairs, sit, rise up from sitting, and run/jog/fast walk without producing or increasing symptoms in the (R) lower buttock, posterior thigh, knee, shin, and between 1st and 2nd toes.   4. Patient to consistently have good posture to promote her symptomfree condition.     Plan:   Re-assess next session and continue with directional preference exercise, posture correction, patient education, and HEP progression.

## 2025-01-31 ENCOUNTER — OFFICE VISIT (OUTPATIENT)
Dept: PHYSICAL THERAPY | Age: 70
End: 2025-01-31
Attending: INTERNAL MEDICINE
Payer: MEDICARE

## 2025-01-31 PROCEDURE — 97110 THERAPEUTIC EXERCISES: CPT | Performed by: PHYSICAL THERAPIST

## 2025-01-31 NOTE — PROGRESS NOTES
Diagnosis:  Lumbar radiculopathy (M54.16)  Mechanical low back pain (M54.59)  Lumbar foraminal stenosis (M48.061)  Lumbar spondylosis (M47.816)  Degeneration of intervertebral disc of lumbosacral region with discogenic back pain and lower extremity pain (M51.372)  Bulge of lumbar disc without myelopathy (M51.369)  Facet syndrome, lumbar (M47.816)  Myalgia (M79.10)   Biomechanical lesion (M99.9)        Referring Provider:  Behar, A. MD Date of Evaluation:  12/10/2024    Precautions:  None Date POC Expires: 3/10/2025     Date of Surgery: NA    Next MD visit:   none scheduled     Today's Date: 1/31/2025    Insurance Primary/Secondary: MEDICARE PART A&B/ [employer] [place]   Authorized Visits: 12     Visit Number: 8    Charges: TherEx x 3  Total Timed Treatment: 47 min  Total Treatment time: 48 min    Medication Changes since last visit?: No    Subjective:   Johnnie continue to be symptomfree in the back.  The only thing that he notice is a numbness at the back of the (R) knee when he get out of his vehicle.  He can avoid that by placing both feet on the floor/cement before getting up.  He has been doing his back exercise at least 3x/day (lumbar spine (R) Flex-rotate).  Objective:   ROM: (Pre-test symptom: 0/10 (R) buttock/thigh)  Lumbar        Movement Loss Pain (+/-)   Flexion Nil loss                     NE     Extension Nil loss NE   R Sideglide Nil loss NE   L Sideglide Nil loss NE      Treatment:  Date: 12/12/2024  Tx#: 2/12 Date: 12/17/2024  Tx#: 3/12 Date: 12/19/2024  Tx#: 4/12 Date: 12/27/2024  Tx#: 5/12 Date: 1/3/2025  Tx#: 6/12   Scifit UE only L4 fwd/bkwd x 5 mins ea; NE postural training with lumbar roll    LIT x 10 reps; NE    - less tightness lower back lumbar flexion    LIT over rail x 10 reps; NE     - no tightness lower back flexion and increase to flexion    Pronelying x 1 min; NE    REIL sag x 10 (8+2) reps; NE (more pressure)    + belt OP x 10 reps; NE (pressure back only)    Sustained lumbar  extension with belt OP at pelvis using a table/mat x 3 mins; NE pressure back    REIL with sheet OP x 10 reps; NE    Sitting posture correction with lumbar roll; P, NW (R) foot tingling     - scoot forward 1 inch; Dec, A Scifit UE only L4 fwd/bkwd x 5 mins ea; NE postural training with lumbar roll    LIT hands on wall x 10 reps; NE    Pronelying x 1 min; NE    REIL with manual OP x 10 reps; NE pressure in back only    Prone: Alt LE lift 2 x 10 reps; NE tired    Prone: (B) UE extension 10 reps x 10 cts ea; NE tired    Prone: (B) UE h.abduction 10 reps x 10 cts ea; NE tired    Prone: (B) UE scaption 10 reps x 10 cts ea; NE tired    Prone: back extension (skydiver) 10 reps x 10 cts ea; NE tired    Hydrant (R/L) 1kg ball 5 reps x 10 cts ea; NE      Slouch-over correct x 10 reps; P, NW (R) posterior thigh    Monster walk fwd/bkwd with redTB abduction resist 3 laps x 30 feet; NE tired    LIT over rail x 10 reps; NE TM: Retro walk @ 5% grade x 1.0 mph x 10 mins; NE postural training    LIT hands on wall x 10 reps; NE    Prone: Alt LE lift 3 lbs 2 x 10 reps; NE tired    Prone: (B) UE extension 2 lbs 10 reps x 10 cts ea; NE tired    Prone: (B) UE h.abduction 1 lb 10 reps x 10 cts ea; NE tired    Prone: (B) UE scaption 1 lb 10 reps x 10 cts ea; NE tired    Prone: back extension (skydiver) 10 reps x 10 cts ea; NE tired    Prone: planks on toes and forearms 5 reps x 15 secs ea; NE tired    Monster walk fwd/bkwd with blueTB abduction resist 2 laps x 30 feet; NE    LIT hands on wall x 5 reps; NE   TM: Retro walk @ 8% grade x 1.2 mph x 10 mins; NE postural training    LIT hands on wall x 10 reps; NE    Prone: sustained lumbar extension with belt OP at pelvis using mat/table x 3+3 mins; NE    REIL with belt OP at pelvis x 10 reps; NE    Prone: (B) UE rhtymic-stab extension 1 lb 10 sets x 10 bounces ea; NE    Prone: (B) UE rhytmic-stab h.abduction 0 wt 10 sets x 10 bounces ea; NE     Prone: (B)UE rhytmic-stab scaption 0 wt 10  set x 10 bounces ea; NE    LIT hands on wall x 10 reps; NE   TM: Retro walk @ 12% grade x 1.2 mph x 10 mins; NE postural training    LIT over rail x 10 reps; NE    (R) SGIS shoulder on wall 2 x 10 reps; P, NW (better)     + extension x 10 (8+2) reps; P, NW      (R) Flex-rotate 2 sets x 10 reps x 10 cts; P, NW (better)    + (higher knees)10 more; NE    Supine and SLY: abdominal, oblique, and TA crunches x 10 reps ea; NE tired    Supine: (R) Flex-rotate 10 reps x 10 cts ea; NE       Date: 1/29/2025  Tx#: 7/12 Date: 1/31/2025  Tx#: 8/12      TM: Retro walk @ 12% grade x 1.2 mph x 10 mins; NE postural training    (R) Flex-rotate 10 reps x 10 cts ea; NE     + Manual OP 5 reps x 10 cts ea; NE (more stretch)    Prone: planks on forearm and toes 5 reps x 10 cts ea; NE tired    Hydrant (R/L) LE 1 kg ball 5 reps x 10 cts ea; NE    Step up forward/straddle onto a 6 inch step with a greenTB abduction resist (R/L) LE lead x 20 reps ea; NE    Shuttle: (R/L) LE leg press 8b x 20 reps ea; NE    (R) Flex-rotate x 5 reps x 10 cts ea; NE TM: Retro walk @ 12% grade x 1.2 mph x 10 mins; NE postural training    (R) Flex-rotate 10 reps x 10 cts ea; NE    Seated: (R) LE dural nerve stretch with foot on floor 5 reps x 5 cts ea; P, NW tightness behind (R) knee    (R) Flex-rotate 10 reps x 10 cts ea; NE    Step up forward/straddle onto a  inch step with a blueTB abduction resist (R/L) LE lead x 20 reps ea; NE    Shuttle: (R/L) LE leg press 8b x 20 reps ea; NE        Assessment:   Johnnie continue to respond to a lumbar (R) lateral shift in supine ((R) flex-rotate).  He was instructed on doing a (R) LE dural nerve stretch to relieve the (R) posterior knee symptom.  Explained to Johnnie that he needs to start and end with the lumbar (R) flex-rotate to maintain the reduction of his back symptoms.  All questions and concerns were answered and addressed at this time.     HEP:    - Supine: (R) Flex-rotate 10 reps x 10 cts 2-3x/day  - Seated (R) LE  dural nerve stretch with foot on floor 5 reps x 5 cts each 1-2x/day  - Sitting posture correction with lumbar roll    Goals:    (12 visits)  1. Patient to consistently perform her HEP and it's progression to maintain her improved condition.   2. Patient to have reduced, centralized, and abolished symptoms in the low back to enable easier ADLs, functional, work, and recreational activities.   3. Patient to have WNL of lumbar mobility in all directions to be able to climb up stairs, sit, rise up from sitting, and run/jog/fast walk without producing or increasing symptoms in the (R) lower buttock, posterior thigh, knee, shin, and between 1st and 2nd toes.   4. Patient to consistently have good posture to promote her symptomfree condition.     Plan:   Re-assess next session and continue with directional preference exercise, posture correction, patient education, and HEP progression.

## 2025-02-04 ENCOUNTER — OFFICE VISIT (OUTPATIENT)
Dept: PHYSICAL MEDICINE AND REHAB | Facility: CLINIC | Age: 70
End: 2025-02-04
Payer: MEDICARE

## 2025-02-04 ENCOUNTER — OFFICE VISIT (OUTPATIENT)
Dept: PHYSICAL THERAPY | Age: 70
End: 2025-02-04
Attending: PHYSICAL MEDICINE & REHABILITATION
Payer: MEDICARE

## 2025-02-04 DIAGNOSIS — M54.59 MECHANICAL LOW BACK PAIN: ICD-10-CM

## 2025-02-04 DIAGNOSIS — M99.9 BIOMECHANICAL LESION: ICD-10-CM

## 2025-02-04 DIAGNOSIS — M79.10 MYALGIA: ICD-10-CM

## 2025-02-04 DIAGNOSIS — M54.16 LUMBAR RADICULOPATHY: ICD-10-CM

## 2025-02-04 DIAGNOSIS — M20.41 HAMMER TOE OF RIGHT FOOT: Primary | ICD-10-CM

## 2025-02-04 DIAGNOSIS — M47.816 LUMBAR SPONDYLOSIS: ICD-10-CM

## 2025-02-04 DIAGNOSIS — M47.816 FACET SYNDROME, LUMBAR: ICD-10-CM

## 2025-02-04 DIAGNOSIS — M51.369 BULGE OF LUMBAR DISC WITHOUT MYELOPATHY: ICD-10-CM

## 2025-02-04 DIAGNOSIS — M51.372 DEGENERATION OF INTERVERTEBRAL DISC OF LUMBOSACRAL REGION WITH DISCOGENIC BACK PAIN AND LOWER EXTREMITY PAIN: ICD-10-CM

## 2025-02-04 DIAGNOSIS — M48.061 LUMBAR FORAMINAL STENOSIS: ICD-10-CM

## 2025-02-04 PROCEDURE — 97110 THERAPEUTIC EXERCISES: CPT | Performed by: PHYSICAL THERAPIST

## 2025-02-04 PROCEDURE — 99214 OFFICE O/P EST MOD 30 MIN: CPT | Performed by: PHYSICAL MEDICINE & REHABILITATION

## 2025-02-04 NOTE — PATIENT INSTRUCTIONS
1) Make an appointment with Podiatry to evaluate right 2nd toe Hammer toe  2) Tylenol 500-1000 mg every 6-8 hours as needed for pain.  No more than 3000 mg daily.  3) Continue working with Billy in physical therapy  4) Lets touch base in about 6-8 weeks. If symptoms continue, then would recommend MRI lumbar spine

## 2025-02-04 NOTE — PROGRESS NOTES
[facility]  Progress Note    CHIEF COMPLAINT:    Chief Complaint   Patient presents with    Follow - Up     LOV 12/02/25 Patient is here for a folllow up w/ pain in both buttocks due to sciatica, Pain 3/10. Pain radiates down R leg to top of foot. Admits N/T in R buttock. Admits weakness in both legs. Denies medication. Pt has began physical therapy and finds relief from it.        History of Present Illness:  The patient is a 69 year old right-handed male with no significant past medical history  who presents for follow up of their low back and right buttock pain. He has been in physicla therapy with Billy. I have reviewed those notes. He has improved by about 50%. He rates his pain 3/10. His walking has improved and has tried running which was ok. He is not taking medication for pain. He is also complaining of right 2nd toe elevation.     PAST MEDICAL HISTORY:  History reviewed. No pertinent past medical history.    SURGICAL HISTORY:  Past Surgical History:   Procedure Laterality Date    Colonoscopy  05/2018       SOCIAL HISTORY:   Social History     Occupational History    Not on file   Tobacco Use    Smoking status: Never    Smokeless tobacco: Never   Vaping Use    Vaping status: Never Used   Substance and Sexual Activity    Alcohol use: Yes     Comment: socially    Drug use: No    Sexual activity: Not on file       FAMILY HISTORY:   Family History   Problem Relation Age of Onset    Diabetes Mother     Diabetes Brother     Hypertension Brother        CURRENT MEDICATIONS:   Current Outpatient Medications   Medication Sig Dispense Refill    ALPRAZolam 0.25 MG Oral Tab Take 1 tablet (0.25 mg total) by mouth every 6 (six) hours as needed for Anxiety. 40 tablet 1    escitalopram 10 MG Oral Tab Take 1 tablet (10 mg total) by mouth daily. 90 tablet 1    naproxen (NAPROSYN) 500 MG Oral Tab Take 1 tablet (500 mg total) by mouth 2 (two) times daily with meals. Take for 2  weeks as directed and then as needed. 60 tablet 0    levocetirizine 5 MG Oral Tab Take 1 tablet (5 mg total) by mouth every evening.      Multiple Vitamins-Minerals (MENS MULTI VITAMIN & MINERAL) Oral Tab Take by mouth.      magnesium 30 MG Oral Tab Take 1 tablet (30 mg total) by mouth 2 (two) times daily.      Ascorbic Acid (VITAMIN C) 1000 MG Oral Tab Take 1 tablet (1,000 mg total) by mouth daily.      B Complex Vitamins (VITAMIN B COMPLEX OR) Take by mouth.      NON FORMULARY Take 2 capsules by mouth daily. LUTEIN, XANTHINE,      cyanocobalamin 1000 MCG/ML Injection Solution Inject 1 mL (1,000 mcg total) into the muscle once a week. 22 each 0    Syringe 25G X 5/8\" 3 ML Does not apply Misc Use for IM injections once monthly 50 each 0    fluticasone propionate 50 MCG/ACT Nasal Suspension 1 spray by Each Nare route daily as needed for Rhinitis.         ALLERGIES:   Allergies[1]    REVIEW OF SYSTEMS:   Review of Systems   Constitutional: Negative.    HENT: Negative.    Eyes: Negative.    Respiratory: Negative.    Cardiovascular: Negative.    Gastrointestinal: Negative.    Genitourinary: Negative.    Musculoskeletal: As per HPI  Skin: Negative.    Neurological: As per HPI  Endo/Heme/Allergies: Negative.    Psychiatric/Behavioral: Negative.      All other systems reviewed and are negative. Pertinent positives and negatives noted in the HPI.        PHYSICAL EXAM:   There were no vitals taken for this visit.    There is no height or weight on file to calculate BMI.      General: No immediate distress  Head: Normocephalic/ Atraumatic  Eyes: Extra-occular movements intact.   Ears: No auricular hematoma or deformities  Mouth: No lesions or ulcerations  Heart: peripheral pulses intact. Normal capillary refill.   Lungs: Non-labored respirations  Abdomen: No abdominal guarding  Extremities: No lower extremity edema bilaterally   Skin: No lesions noted.   Cognition: alert & oriented x 3, attentive, able to follow 2 step  commands, comprehention intact, spontaneous speech intact  Motor:    Musculoskeletal:    LUMBAR SPINE:  Inspection: no erythema, swelling, or obvious deformity.  Their iliac crest and shoulder heights are symmetrical.  Postural exam reveals no scoliosis or kyphosis.  Palpation: Tender to palpation over the right glued and piriformis  ROM: Limited in extension.  Motor: 5/5 in all myotomes of the BILATERAL lower extremities 4 out of 5 during right great toe extension  Sensation: Intact to light touch in all dermatomes of the lower extremities except decrease sensation to light touch over the right L4 right L5 dermatomes  Reflexes: 3/4 at L4 and S1  Facet Loading: no specific facet pain  AGNES: Negative  Straight leg raise: negative for radicular pain symptoms  Slump test: negative for pain symptoms or radicular pain symptoms        Gait:  Negative    Data  Office Visit on 10/08/2024   Component Date Value Ref Range Status    Ventricular rate 10/08/2024 76  BPM Final    Atrial rate 10/08/2024 76  BPM Final    P-R Interval 10/08/2024 182  ms Final    QRS Duration 10/08/2024 92  ms Final    Q-T Interval 10/08/2024 398  ms Final    QTC Calculation (Bezet) 10/08/2024 447  ms Final    P Axis 10/08/2024 66  degrees Final    R Axis 10/08/2024 26  degrees Final    T Axis 10/08/2024 56  degrees Final   [facility] Lab Encounter on 10/05/2024   Component Date Value Ref Range Status    Testosterone Tot LC/MS 10/05/2024 476.1  264.0 - 916.0 ng/dL Final    Testost % Free+Weak Bnd 10/05/2024 15.1  9.0 - 46.0 % Final    Testost Free+Weak Bnd 10/05/2024 71.9  40.0 - 250.0 ng/dL Final    Sex Horm Bind Glob 10/05/2024 47.5  19.3 - 76.4 nmol/L Final    Glucose 10/05/2024 101 (H)  70 - 99 mg/dL Final    Sodium 10/05/2024 142  136 - 145 mmol/L Final    Potassium 10/05/2024 4.1  3.5 - 5.1 mmol/L Final    Chloride 10/05/2024 109  98 - 112 mmol/L Final    CO2 10/05/2024 25.0  21.0 - 32.0 mmol/L Final    Anion Gap 10/05/2024 8  0 - 18 mmol/L Final     BUN 10/05/2024 13  9 - 23 mg/dL Final    Creatinine 10/05/2024 0.86  0.70 - 1.30 mg/dL Final    BUN/CREA Ratio 10/05/2024 15.1  10.0 - 20.0 Final    Calcium, Total 10/05/2024 9.1  8.7 - 10.4 mg/dL Final    Calculated Osmolality 10/05/2024 294  275 - 295 mOsm/kg Final    eGFR-Cr 10/05/2024 93  >=60 mL/min/1.73m2 Final    ALT 10/05/2024 27  10 - 49 U/L Final    AST 10/05/2024 26  <34 U/L Final    Alkaline Phosphatase 10/05/2024 82  45 - 117 U/L Final    Bilirubin, Total 10/05/2024 1.0  0.2 - 1.1 mg/dL Final    Total Protein 10/05/2024 7.5  5.7 - 8.2 g/dL Final    Albumin 10/05/2024 4.3  3.2 - 4.8 g/dL Final    Globulin  10/05/2024 3.2  2.0 - 3.5 g/dL Final    A/G Ratio 10/05/2024 1.3  1.0 - 2.0 Final    Patient Fasting for CMP? 10/05/2024 Yes   Final    HgbA1C 10/05/2024 5.5  <5.7 % Final    Estimated Average Glucose 10/05/2024 111  68 - 126 mg/dL Final   ]      Radiology Imaging:  I reviewed with the patient his X-ray of the lumbar spine from 11/9/2024  XR LUMBAR SPINE (MIN 4 VIEWS) (CPT=72110)  Narrative: PROCEDURE: XR LUMBAR SPINE (MIN 4 VIEWS) (CPT=72110)     COMPARISON: None.     INDICATIONS: Lower back pain x 2 months. No known injury.     TECHNIQUE: Lumbar spine radiographs (minimum 4 views)       FINDINGS:      ALIGNMENT:   There is straightening of the normal lumbar lordosis.  Trace retrolisthesis of L5 on S1.     VERTEBRAL BODIES:    There is a smooth indentation along the posterior inferior endplate of the L5 vertebral body.  No retropulsion.  vertebral body heights are maintained. There are flowing ventral osteophytes at multiple consecutive levels, compatible   with diffuse idiopathic skeletal hyperostosis (DISH).  There is diffuse osseous demineralization, which limits sensitivity for detection of osseous pathology.       DISC SPACES:  There is multilevel intervertebral loss of disc height, which is most advanced at L5-S1, where it is moderate.     FACET JOINTS:  Advanced facet arthropathy at L4-5 and  L5-S1.     SACROILIAC JOINTS:  Inferior left sacroiliac joint osteophyte formation.  Unremarkable right sacroiliac joint.     OTHER:   There is scattered mild osseous neural foraminal narrowing bilaterally.  There is moderate osseous neural foraminal narrowing on the right at L2-3 and moderate on the left at L1-2, L2-3, and L3-4.                 Impression: CONCLUSION:      Moderate multilevel degenerative disease of the spine, with up to moderate osseous neural foraminal narrowing, as detailed above.     Mild ovoid depression of the inferior endplate of the L5 vertebral body, favored to represent a Schmorl's node.  An age indeterminate mild inferior endplate compression fracture is a less likely differential consideration.  Correlate with symptoms.  No   retropulsion.                 Dictated by (CST): Rose Barahona MD on 11/09/2024 at 9:37 AM       Finalized by (CST): Rose Barahona MD on 11/09/2024 at 9:41 AM              ASSESSMENT AND PLAN:  Johnnie is a pleasant 69-year-old male who presents for follow-up of his low back pain with occasional radicular symptoms down the right leg.  His symptoms improved thus far with physical therapy.  I am recommending he continue with physical therapy and a home exercise program.  He should use Tylenol if needed and follow-up with me in 6 to 8 weeks.  If his symptoms continue, then I would recommend an MRI of the lumbar spine.  He also complained of a hammertoe in the right foot.  I have referred him to podiatry.       RTC in 6 to 8 weeks  Discharge Instructions were provided as documented in AVS summary.  The patient was in agreement with the assessment and plan.  All questions were answered.  There were no barriers to learning.         1. Hammer toe of right foot    2. Lumbar radiculopathy    3. Mechanical low back pain    4. Lumbar foraminal stenosis    5. Lumbar spondylosis    6. Degeneration of intervertebral disc of lumbosacral region with discogenic back pain and  lower extremity pain    7. Bulge of lumbar disc without myelopathy    8. Myalgia    9. Biomechanical lesion    10. Facet syndrome, lumbar        Alex B. Behar MD  Physical Medicine and Rehabilitation/Sports Medicine  [address] Cures Act Notice to Patient: Medical documents like this are made available to patients in the interest of transparency. However, be advised this is a medical document and it is intended as ilby-ti-kwhz communication between your medical providers. This medical document may contain abbreviations, assessments, medical data, and results or other terms that are unfamiliar. Medical documents are intended to carry relevant information, facts as evident, and the clinical opinion of the practitioner. As such, this medical document may be written in language that appears blunt or direct. You are encouraged to contact your medical provider and/or [facility] Experience if you have any questions about this medical document.          [1] No Known Allergies

## 2025-02-05 NOTE — PROGRESS NOTES
Diagnosis:  Lumbar radiculopathy (M54.16)  Mechanical low back pain (M54.59)  Lumbar foraminal stenosis (M48.061)  Lumbar spondylosis (M47.816)  Degeneration of intervertebral disc of lumbosacral region with discogenic back pain and lower extremity pain (M51.372)  Bulge of lumbar disc without myelopathy (M51.369)  Facet syndrome, lumbar (M47.816)  Myalgia (M79.10)   Biomechanical lesion (M99.9)        Referring Provider:  Behar, A. MD Date of Evaluation:  12/10/2024    Precautions:  None Date POC Expires: 3/10/2025     Date of Surgery: NA    Next MD visit:   none scheduled     Today's Date: 2/4/2025    Insurance Primary/Secondary: MEDICARE PART A&B/ [employer]DEMN[place]   Authorized Visits: 12     Visit Number: 9    Charges: TherEx x 4  Total Timed Treatment: 54 min  Total Treatment time: 56 min    Medication Changes since last visit?: No    Subjective:   Johnnie state that he does not have back symptoms and he did not report any numbness in the (R) hip/thigh today but he feels an ache in the (R) dorsal foot in the morning.  He has to tell himself to correct his gait pattern on the (R) foot by promoting a heel-toe pattern.  He feels better after doing this gait correction but it return the next morning.  He has been doing his HEP ((R) Flex-rotate and (R) LE dural nerve stretch) but he is no sure if he is doing the (R) LE dural nerve stretch correctly.   Objective:   ROM: (Pre-test symptom: 0/10 (R) buttock/thigh)  Lumbar        Movement Loss Pain (+/-)   Flexion Nil loss                     NE     Extension Nil loss NE   R Sideglide Nil loss NE   L Sideglide Nil loss NE      Treatment:  Date: 12/12/2024  Tx#: 2/12 Date: 12/17/2024  Tx#: 3/12 Date: 12/19/2024  Tx#: 4/12 Date: 12/27/2024  Tx#: 5/12 Date: 1/3/2025  Tx#: 6/12   Scifit UE only L4 fwd/bkwd x 5 mins ea; NE postural training with lumbar roll    LIT x 10 reps; NE    - less tightness lower back lumbar flexion    LIT over rail x 10 reps; NE     - no tightness  lower back flexion and increase to flexion    Pronelying x 1 min; NE    REIL sag x 10 (8+2) reps; NE (more pressure)    + belt OP x 10 reps; NE (pressure back only)    Sustained lumbar extension with belt OP at pelvis using a table/mat x 3 mins; NE pressure back    REIL with sheet OP x 10 reps; NE    Sitting posture correction with lumbar roll; P, NW (R) foot tingling     - scoot forward 1 inch; Dec, A Scifit UE only L4 fwd/bkwd x 5 mins ea; NE postural training with lumbar roll    LIT hands on wall x 10 reps; NE    Pronelying x 1 min; NE    REIL with manual OP x 10 reps; NE pressure in back only    Prone: Alt LE lift 2 x 10 reps; NE tired    Prone: (B) UE extension 10 reps x 10 cts ea; NE tired    Prone: (B) UE h.abduction 10 reps x 10 cts ea; NE tired    Prone: (B) UE scaption 10 reps x 10 cts ea; NE tired    Prone: back extension (skydiver) 10 reps x 10 cts ea; NE tired    Hydrant (R/L) 1kg ball 5 reps x 10 cts ea; NE      Slouch-over correct x 10 reps; P, NW (R) posterior thigh    Monster walk fwd/bkwd with redTB abduction resist 3 laps x 30 feet; NE tired    LIT over rail x 10 reps; NE TM: Retro walk @ 5% grade x 1.0 mph x 10 mins; NE postural training    LIT hands on wall x 10 reps; NE    Prone: Alt LE lift 3 lbs 2 x 10 reps; NE tired    Prone: (B) UE extension 2 lbs 10 reps x 10 cts ea; NE tired    Prone: (B) UE h.abduction 1 lb 10 reps x 10 cts ea; NE tired    Prone: (B) UE scaption 1 lb 10 reps x 10 cts ea; NE tired    Prone: back extension (skydiver) 10 reps x 10 cts ea; NE tired    Prone: planks on toes and forearms 5 reps x 15 secs ea; NE tired    Monster walk fwd/bkwd with blueTB abduction resist 2 laps x 30 feet; NE    LIT hands on wall x 5 reps; NE   TM: Retro walk @ 8% grade x 1.2 mph x 10 mins; NE postural training    LIT hands on wall x 10 reps; NE    Prone: sustained lumbar extension with belt OP at pelvis using mat/table x 3+3 mins; NE    REIL with belt OP at pelvis x 10 reps; NE    Prone:  (B) UE rhtymic-stab extension 1 lb 10 sets x 10 bounces ea; NE    Prone: (B) UE rhytmic-stab h.abduction 0 wt 10 sets x 10 bounces ea; NE     Prone: (B)UE rhytmic-stab scaption 0 wt 10 set x 10 bounces ea; NE    LIT hands on wall x 10 reps; NE   TM: Retro walk @ 12% grade x 1.2 mph x 10 mins; NE postural training    LIT over rail x 10 reps; NE    (R) SGIS shoulder on wall 2 x 10 reps; P, NW (better)     + extension x 10 (8+2) reps; P, NW      (R) Flex-rotate 2 sets x 10 reps x 10 cts; P, NW (better)    + (higher knees)10 more; NE    Supine and SLY: abdominal, oblique, and TA crunches x 10 reps ea; NE tired    Supine: (R) Flex-rotate 10 reps x 10 cts ea; NE       Date: 1/29/2025  Tx#: 7/12 Date: 1/31/2025  Tx#: 8/12 Date: 2/4/2025  Tx#: 9/12     TM: Retro walk @ 12% grade x 1.2 mph x 10 mins; NE postural training    (R) Flex-rotate 10 reps x 10 cts ea; NE     + Manual OP 5 reps x 10 cts ea; NE (more stretch)    Prone: planks on forearm and toes 5 reps x 10 cts ea; NE tired    Hydrant (R/L) LE 1 kg ball 5 reps x 10 cts ea; NE    Step up forward/straddle onto a 6 inch step with a greenTB abduction resist (R/L) LE lead x 20 reps ea; NE    Shuttle: (R/L) LE leg press 8b x 20 reps ea; NE    (R) Flex-rotate x 5 reps x 10 cts ea; NE TM: Retro walk @ 12% grade x 1.2 mph x 10 mins; NE postural training    (R) Flex-rotate 10 reps x 10 cts ea; NE    Seated: (R) LE dural nerve stretch with foot on floor 5 reps x 5 cts ea; P, NW tightness behind (R) knee    (R) Flex-rotate 10 reps x 10 cts ea; NE    Step up forward/straddle onto a  inch step with a blueTB abduction resist (R/L) LE lead x 20 reps ea; NE    Shuttle: (R/L) LE leg press 8b x 20 reps ea; NE TM: Retro walk @ 15% grade x 1.2 mph x 10 mins; NE postural training    (R) Flex-rotate 10 reps x 10 cts ea; NE    Seated: (R) LE dural nerve stretch with foot on floor 5 reps x 5 cts ea; P, NW tightness behind (R) knee    (R) Flex-rotate 10 reps x 10 cts ea; NE    (R) Ankle DF  mob using a wedge and a towel roll 2 x 10 reps; NE    (B) UE rhytmic-stab extension blueTB 10 sets x 10 bounces ea; NE    Hydrant (R/L) LE 2 kg ball 5 reps x 10 cts ea; NE    Monster walk fwd/bkwd with greenTB abduction resist 3 laps x 30 feet; NE tired    (B) UE OH wall walk with redTB abduction resist 10 reps x 10 cts ea; NE    (R) Flex-rotate x 5 reps x 10 cts ea; P, NW (R) heel (better)        Assessment:   Johnnie continue to respond to a lumbar (R) lateral shift in supine ((R) flex-rotate).  He required cueing to correct his technique in performing (R) LE dural nerve stretch.  He felt a better dural/nerve stretch upon correction.  He has WNL of lumbar AROM in all directions without symptoms.  Added (R) Ankle  DF mobilization to his treatment and HEP to relieve his symptom in the (R) ankle/foot in the morning.  He understood and agreed with the added treatment exercise.  Reinforced posture correction in sitting using a lumbar roll.  All questions and concerns were answered and addressed at this time.     HEP:    - Supine: (R) Flex-rotate 10 reps x 10 cts 2-3x/day  - Seated (R) LE dural nerve stretch with foot on floor 5 reps x 5 cts each 1-2x/day  - Sitting posture correction with lumbar roll  - (R) Ankle DF mobilization using a towel roll under the balls of foot x 10-20 reps 2-3x/day    Goals:    (12 visits)  1. Patient to consistently perform her HEP and it's progression to maintain her improved condition.   2. Patient to have reduced, centralized, and abolished symptoms in the low back to enable easier ADLs, functional, work, and recreational activities.   3. Patient to have WNL of lumbar mobility in all directions to be able to climb up stairs, sit, rise up from sitting, and run/jog/fast walk without producing or increasing symptoms in the (R) lower buttock, posterior thigh, knee, shin, and between 1st and 2nd toes.   4. Patient to consistently have good posture to promote her symptomfree condition.     Plan:    Re-assess next session and continue with directional preference exercise, posture correction, patient education, and HEP progression.

## 2025-02-07 ENCOUNTER — OFFICE VISIT (OUTPATIENT)
Dept: PHYSICAL THERAPY | Age: 70
End: 2025-02-07
Attending: PHYSICAL MEDICINE & REHABILITATION
Payer: MEDICARE

## 2025-02-07 PROCEDURE — 97110 THERAPEUTIC EXERCISES: CPT | Performed by: PHYSICAL THERAPIST

## 2025-02-07 NOTE — PROGRESS NOTES
Diagnosis:  Lumbar radiculopathy (M54.16)  Mechanical low back pain (M54.59)  Lumbar foraminal stenosis (M48.061)  Lumbar spondylosis (M47.816)  Degeneration of intervertebral disc of lumbosacral region with discogenic back pain and lower extremity pain (M51.372)  Bulge of lumbar disc without myelopathy (M51.369)  Facet syndrome, lumbar (M47.816)  Myalgia (M79.10)   Biomechanical lesion (M99.9)        Referring Provider:  Behar, A. MD Date of Evaluation:  12/10/2024    Precautions:  None Date POC Expires: 3/10/2025     Date of Surgery: NA    Next MD visit:   none scheduled     Today's Date: 2/7/2025    Insurance Primary/Secondary: MEDICARE PART A&B/ [employer] [place] INDEMN[place]   Authorized Visits: 12     Visit Number: 10    Charges: TherEx x 4  Total Timed Treatment: 53 min  Total Treatment time: 54 min    Medication Changes since last visit?: No    Subjective:   Johnnie state that he is feeling really good.  The exercise he started last session for the (R) foot took away the pain/discomfort in the (R) dorsal foot.  His back continue to feel symptomfree and he is doing his HEP for the back and (R) foot/ankle regularly.  He is maintaining good posture in sitting using a lumbar roll specially while driving.  He report that he was even able to \"run\" up the stairs at home which he has done for a long time.   Objective:   ROM: (Pre-test symptom: 0/10 (R) buttock/thigh)  Lumbar        Movement Loss Pain (+/-)   Flexion Nil loss                     NE     Extension Nil loss NE   R Sideglide Nil loss NE   L Sideglide Nil loss NE      Treatment:  Date: 12/12/2024  Tx#: 2/12 Date: 12/17/2024  Tx#: 3/12 Date: 12/19/2024  Tx#: 4/12 Date: 12/27/2024  Tx#: 5/12 Date: 1/3/2025  Tx#: 6/12   Scifit UE only L4 fwd/bkwd x 5 mins ea; NE postural training with lumbar roll    LIT x 10 reps; NE    - less tightness lower back lumbar flexion    LIT over rail x 10 reps; NE     - no tightness lower back flexion and increase to flexion    Pronelying x 1  min; NE    REIL sag x 10 (8+2) reps; NE (more pressure)    + belt OP x 10 reps; NE (pressure back only)    Sustained lumbar extension with belt OP at pelvis using a table/mat x 3 mins; NE pressure back    REIL with sheet OP x 10 reps; NE    Sitting posture correction with lumbar roll; P, NW (R) foot tingling     - scoot forward 1 inch; Dec, A Scifit UE only L4 fwd/bkwd x 5 mins ea; NE postural training with lumbar roll    LIT hands on wall x 10 reps; NE    Pronelying x 1 min; NE    REIL with manual OP x 10 reps; NE pressure in back only    Prone: Alt LE lift 2 x 10 reps; NE tired    Prone: (B) UE extension 10 reps x 10 cts ea; NE tired    Prone: (B) UE h.abduction 10 reps x 10 cts ea; NE tired    Prone: (B) UE scaption 10 reps x 10 cts ea; NE tired    Prone: back extension (skydiver) 10 reps x 10 cts ea; NE tired    Hydrant (R/L) 1kg ball 5 reps x 10 cts ea; NE      Slouch-over correct x 10 reps; P, NW (R) posterior thigh    Monster walk fwd/bkwd with redTB abduction resist 3 laps x 30 feet; NE tired    LIT over rail x 10 reps; NE TM: Retro walk @ 5% grade x 1.0 mph x 10 mins; NE postural training    LIT hands on wall x 10 reps; NE    Prone: Alt LE lift 3 lbs 2 x 10 reps; NE tired    Prone: (B) UE extension 2 lbs 10 reps x 10 cts ea; NE tired    Prone: (B) UE h.abduction 1 lb 10 reps x 10 cts ea; NE tired    Prone: (B) UE scaption 1 lb 10 reps x 10 cts ea; NE tired    Prone: back extension (skydiver) 10 reps x 10 cts ea; NE tired    Prone: planks on toes and forearms 5 reps x 15 secs ea; NE tired    Monster walk fwd/bkwd with blueTB abduction resist 2 laps x 30 feet; NE    LIT hands on wall x 5 reps; NE   TM: Retro walk @ 8% grade x 1.2 mph x 10 mins; NE postural training    LIT hands on wall x 10 reps; NE    Prone: sustained lumbar extension with belt OP at pelvis using mat/table x 3+3 mins; NE    REIL with belt OP at pelvis x 10 reps; NE    Prone: (B) UE rhtymic-stab extension 1 lb 10 sets x 10 bounces ea;  NE    Prone: (B) UE rhytmic-stab h.abduction 0 wt 10 sets x 10 bounces ea; NE     Prone: (B)UE rhytmic-stab scaption 0 wt 10 set x 10 bounces ea; NE    LIT hands on wall x 10 reps; NE   TM: Retro walk @ 12% grade x 1.2 mph x 10 mins; NE postural training    LIT over rail x 10 reps; NE    (R) SGIS shoulder on wall 2 x 10 reps; P, NW (better)     + extension x 10 (8+2) reps; P, NW      (R) Flex-rotate 2 sets x 10 reps x 10 cts; P, NW (better)    + (higher knees)10 more; NE    Supine and SLY: abdominal, oblique, and TA crunches x 10 reps ea; NE tired    Supine: (R) Flex-rotate 10 reps x 10 cts ea; NE       Date: 1/29/2025  Tx#: 7/12 Date: 1/31/2025  Tx#: 8/12 Date: 2/4/2025  Tx#: 9/12 Date: 2/7/2025  Tx#: 10/12    TM: Retro walk @ 12% grade x 1.2 mph x 10 mins; NE postural training    (R) Flex-rotate 10 reps x 10 cts ea; NE     + Manual OP 5 reps x 10 cts ea; NE (more stretch)    Prone: planks on forearm and toes 5 reps x 10 cts ea; NE tired    Hydrant (R/L) LE 1 kg ball 5 reps x 10 cts ea; NE    Step up forward/straddle onto a 6 inch step with a greenTB abduction resist (R/L) LE lead x 20 reps ea; NE    Shuttle: (R/L) LE leg press 8b x 20 reps ea; NE    (R) Flex-rotate x 5 reps x 10 cts ea; NE TM: Retro walk @ 12% grade x 1.2 mph x 10 mins; NE postural training    (R) Flex-rotate 10 reps x 10 cts ea; NE    Seated: (R) LE dural nerve stretch with foot on floor 5 reps x 5 cts ea; P, NW tightness behind (R) knee    (R) Flex-rotate 10 reps x 10 cts ea; NE    Step up forward/straddle onto a  inch step with a blueTB abduction resist (R/L) LE lead x 20 reps ea; NE    Shuttle: (R/L) LE leg press 8b x 20 reps ea; NE TM: Retro walk @ 15% grade x 1.2 mph x 10 mins; NE postural training    (R) Flex-rotate 10 reps x 10 cts ea; NE    Seated: (R) LE dural nerve stretch with foot on floor 5 reps x 5 cts ea; P, NW tightness behind (R) knee    (R) Flex-rotate 10 reps x 10 cts ea; NE    (R) Ankle DF mob using a wedge and a towel roll  2 x 10 reps; NE    (B) UE rhytmic-stab extension blueTB 10 sets x 10 bounces ea; NE    Hydrant (R/L) LE 2 kg ball 5 reps x 10 cts ea; NE    Monster walk fwd/bkwd with greenTB abduction resist 3 laps x 30 feet; NE tired    (B) UE OH wall walk with redTB abduction resist 10 reps x 10 cts ea; NE    (R) Flex-rotate x 5 reps x 10 cts ea; P, NW (R) heel (better)  TM: Retro walk @ 15% grade x 1.2 mph x 10 mins; NE postural training    (R) Flex-rotate 10 reps x 10 cts ea; NE    Seated: (R) LE dural nerve stretch with foot on floor 5 reps x 5 cts ea; P, NW tightness behind (R) knee (less)    (R) Flex-rotate 10 reps x 10 cts ea; NE    (R) Ankle DF mob using a towel roll x 10 reps; NE    (R) Ankle eccen DF load with blackTB 2 x 10 reps x 10 eccen ct ea; NE     (R) Hallux resisted PF with blackTB x 20 reps; NE    Prone: (B) UE rhtymic-stab extension 2 lbs 10 sets x 10 bounces ea; NE  tired    Prone: (B) UE rhytmic-stab h.abduction 2 lbs 10 sets x 10 bounces ea; NE tired    Prone: (B) UE rhytmic-stab scaption 1 lb 10 sets x 10 bounces ea; NE tired    Prone: back extension (skydiver) 10 reps x 10 cts ea; NE    Prone: plank on toes and forearm 5 reps x 10 cts ea; NE    (R/L) forward stance with 4 lbs ball diagonal lift 2 sets x 10 reps ea; NE     (R) Flex-rotate x 5 reps x 10 cts ea; NE       Assessment:   Johnnie continue to respond to a lumbar (R) lateral shift in supine ((R) flex-rotate).  He is also responding to (R) ankle DF mobs with progression to eccentric strengthening with a blackTB.  He performed postural strengthening/stability exercises in prone and standing positions with added diagonal pattern.  She was tired after his session but no report of back and (R) ankle/foot symptoms.  Issued a blackTB and added eccentric (R) Ankle DF strengthening to his HEP.  All questions and concerns were answered and addressed at this time.     HEP:    - Supine: (R) Flex-rotate 10 reps x 10 cts 2-3x/day  - Seated (R) LE dural nerve  stretch with foot on floor 5 reps x 5 cts each 1-2x/day  - Sitting posture correction with lumbar roll  - (R) Ankle DF mobilization using a towel roll under the balls of foot x 10-20 reps 2-3x/day  - (R) Ankle eccentric DF with blackTB 10 reps x 10 eccen ct ea 1-2x/day    Goals:    (12 visits)  1. Patient to consistently perform her HEP and it's progression to maintain her improved condition.   2. Patient to have reduced, centralized, and abolished symptoms in the low back to enable easier ADLs, functional, work, and recreational activities.   3. Patient to have WNL of lumbar mobility in all directions to be able to climb up stairs, sit, rise up from sitting, and run/jog/fast walk without producing or increasing symptoms in the (R) lower buttock, posterior thigh, knee, shin, and between 1st and 2nd toes.   4. Patient to consistently have good posture to promote her symptomfree condition.     Plan:   Re-assess next session and continue with directional preference exercise, posture correction, strengthening/stability exercises, patient education, and HEP progression.

## 2025-02-11 ENCOUNTER — OFFICE VISIT (OUTPATIENT)
Dept: PHYSICAL THERAPY | Age: 70
End: 2025-02-11
Attending: PHYSICAL MEDICINE & REHABILITATION
Payer: MEDICARE

## 2025-02-11 PROCEDURE — 97110 THERAPEUTIC EXERCISES: CPT | Performed by: PHYSICAL THERAPIST

## 2025-02-12 NOTE — PROGRESS NOTES
Diagnosis:  Lumbar radiculopathy (M54.16)  Mechanical low back pain (M54.59)  Lumbar foraminal stenosis (M48.061)  Lumbar spondylosis (M47.816)  Degeneration of intervertebral disc of lumbosacral region with discogenic back pain and lower extremity pain (M51.372)  Bulge of lumbar disc without myelopathy (M51.369)  Facet syndrome, lumbar (M47.816)  Myalgia (M79.10)   Biomechanical lesion (M99.9)        Referring Provider:  Behar, A. MD Date of Evaluation:  12/10/2024    Precautions:  None Date POC Expires: 3/10/2025     Date of Surgery: NA    Next MD visit:   none scheduled     Today's Date: 2/11/2025    Insurance Primary/Secondary: MEDICARE PART A&B/ [employer] [place] INDEMN[place]   Authorized Visits: 12     Visit Number: 11    Charges: TherEx x 3  Total Timed Treatment: 48 min  Total Treatment time: 49 min    Medication Changes since last visit?: No    Subjective:   Johnnie state that his back is doing good.  There is no pain and he can do all of his work and home activities without symptoms.  His (R) ankle/foot is also better but the pain return after doing hid (R) Ankle DF mobs.  He has been doing the strengthening exercise but only 1x/day.      Objective:   ROM: (Pre-test symptom: 0/10 (R) buttock/thigh)  Lumbar        Movement Loss Pain (+/-)   Flexion Nil loss                     NE     Extension Nil loss NE   R Sideglide Nil loss NE   L Sideglide Nil loss NE      Treatment:  Date: 12/12/2024  Tx#: 2/12 Date: 12/17/2024  Tx#: 3/12 Date: 12/19/2024  Tx#: 4/12 Date: 12/27/2024  Tx#: 5/12 Date: 1/3/2025  Tx#: 6/12   Scifit UE only L4 fwd/bkwd x 5 mins ea; NE postural training with lumbar roll    LIT x 10 reps; NE    - less tightness lower back lumbar flexion    LIT over rail x 10 reps; NE     - no tightness lower back flexion and increase to flexion    Pronelying x 1 min; NE    REIL sag x 10 (8+2) reps; NE (more pressure)    + belt OP x 10 reps; NE (pressure back only)    Sustained lumbar extension with belt OP at pelvis using  a table/mat x 3 mins; NE pressure back    REIL with sheet OP x 10 reps; NE    Sitting posture correction with lumbar roll; P, NW (R) foot tingling     - scoot forward 1 inch; Dec, A Scifit UE only L4 fwd/bkwd x 5 mins ea; NE postural training with lumbar roll    LIT hands on wall x 10 reps; NE    Pronelying x 1 min; NE    REIL with manual OP x 10 reps; NE pressure in back only    Prone: Alt LE lift 2 x 10 reps; NE tired    Prone: (B) UE extension 10 reps x 10 cts ea; NE tired    Prone: (B) UE h.abduction 10 reps x 10 cts ea; NE tired    Prone: (B) UE scaption 10 reps x 10 cts ea; NE tired    Prone: back extension (skydiver) 10 reps x 10 cts ea; NE tired    Hydrant (R/L) 1kg ball 5 reps x 10 cts ea; NE      Slouch-over correct x 10 reps; P, NW (R) posterior thigh    Monster walk fwd/bkwd with redTB abduction resist 3 laps x 30 feet; NE tired    LIT over rail x 10 reps; NE TM: Retro walk @ 5% grade x 1.0 mph x 10 mins; NE postural training    LIT hands on wall x 10 reps; NE    Prone: Alt LE lift 3 lbs 2 x 10 reps; NE tired    Prone: (B) UE extension 2 lbs 10 reps x 10 cts ea; NE tired    Prone: (B) UE h.abduction 1 lb 10 reps x 10 cts ea; NE tired    Prone: (B) UE scaption 1 lb 10 reps x 10 cts ea; NE tired    Prone: back extension (skydiver) 10 reps x 10 cts ea; NE tired    Prone: planks on toes and forearms 5 reps x 15 secs ea; NE tired    Monster walk fwd/bkwd with blueTB abduction resist 2 laps x 30 feet; NE    LIT hands on wall x 5 reps; NE   TM: Retro walk @ 8% grade x 1.2 mph x 10 mins; NE postural training    LIT hands on wall x 10 reps; NE    Prone: sustained lumbar extension with belt OP at pelvis using mat/table x 3+3 mins; NE    REIL with belt OP at pelvis x 10 reps; NE    Prone: (B) UE rhtymic-stab extension 1 lb 10 sets x 10 bounces ea; NE    Prone: (B) UE rhytmic-stab h.abduction 0 wt 10 sets x 10 bounces ea; NE     Prone: (B)UE rhytmic-stab scaption 0 wt 10 set x 10 bounces ea; NE    LIT hands on  wall x 10 reps; NE   TM: Retro walk @ 12% grade x 1.2 mph x 10 mins; NE postural training    LIT over rail x 10 reps; NE    (R) SGIS shoulder on wall 2 x 10 reps; P, NW (better)     + extension x 10 (8+2) reps; P, NW      (R) Flex-rotate 2 sets x 10 reps x 10 cts; P, NW (better)    + (higher knees)10 more; NE    Supine and SLY: abdominal, oblique, and TA crunches x 10 reps ea; NE tired    Supine: (R) Flex-rotate 10 reps x 10 cts ea; NE       Date: 1/29/2025  Tx#: 7/12 Date: 1/31/2025  Tx#: 8/12 Date: 2/4/2025  Tx#: 9/12 Date: 2/7/2025  Tx#: 10/12 Date: 2/11/2025  Tx#: 11/12   TM: Retro walk @ 12% grade x 1.2 mph x 10 mins; NE postural training    (R) Flex-rotate 10 reps x 10 cts ea; NE     + Manual OP 5 reps x 10 cts ea; NE (more stretch)    Prone: planks on forearm and toes 5 reps x 10 cts ea; NE tired    Hydrant (R/L) LE 1 kg ball 5 reps x 10 cts ea; NE    Step up forward/straddle onto a 6 inch step with a greenTB abduction resist (R/L) LE lead x 20 reps ea; NE    Shuttle: (R/L) LE leg press 8b x 20 reps ea; NE    (R) Flex-rotate x 5 reps x 10 cts ea; NE TM: Retro walk @ 12% grade x 1.2 mph x 10 mins; NE postural training    (R) Flex-rotate 10 reps x 10 cts ea; NE    Seated: (R) LE dural nerve stretch with foot on floor 5 reps x 5 cts ea; P, NW tightness behind (R) knee    (R) Flex-rotate 10 reps x 10 cts ea; NE    Step up forward/straddle onto a  inch step with a blueTB abduction resist (R/L) LE lead x 20 reps ea; NE    Shuttle: (R/L) LE leg press 8b x 20 reps ea; NE TM: Retro walk @ 15% grade x 1.2 mph x 10 mins; NE postural training    (R) Flex-rotate 10 reps x 10 cts ea; NE    Seated: (R) LE dural nerve stretch with foot on floor 5 reps x 5 cts ea; P, NW tightness behind (R) knee    (R) Flex-rotate 10 reps x 10 cts ea; NE    (R) Ankle DF mob using a wedge and a towel roll 2 x 10 reps; NE    (B) UE rhytmic-stab extension blueTB 10 sets x 10 bounces ea; NE    Hydrant (R/L) LE 2 kg ball 5 reps x 10 cts ea;  NE    Monster walk fwd/bkwd with greenTB abduction resist 3 laps x 30 feet; NE tired    (B) UE OH wall walk with redTB abduction resist 10 reps x 10 cts ea; NE    (R) Flex-rotate x 5 reps x 10 cts ea; P, NW (R) heel (better)  TM: Retro walk @ 15% grade x 1.2 mph x 10 mins; NE postural training    (R) Flex-rotate 10 reps x 10 cts ea; NE    Seated: (R) LE dural nerve stretch with foot on floor 5 reps x 5 cts ea; P, NW tightness behind (R) knee (less)    (R) Flex-rotate 10 reps x 10 cts ea; NE    (R) Ankle DF mob using a towel roll x 10 reps; NE    (R) Ankle eccen DF load with blackTB 2 x 10 reps x 10 eccen ct ea; NE     (R) Hallux resisted PF with blackTB x 20 reps; NE    Prone: (B) UE rhtymic-stab extension 2 lbs 10 sets x 10 bounces ea; NE  tired    Prone: (B) UE rhytmic-stab h.abduction 2 lbs 10 sets x 10 bounces ea; NE tired    Prone: (B) UE rhytmic-stab scaption 1 lb 10 sets x 10 bounces ea; NE tired    Prone: back extension (skydiver) 10 reps x 10 cts ea; NE    Prone: plank on toes and forearm 5 reps x 10 cts ea; NE    (R/L) forward stance with 4 lbs ball diagonal lift 2 sets x 10 reps ea; NE     (R) Flex-rotate x 5 reps x 10 cts ea; NE  TM: Retro walk @ 15% grade x 1.2 mph x 10 mins; NE postural training    Seated: (R) LE dural nerve stretch with foot on floor 5 reps x 5 cts ea; P, NW tightness behind (R) knee (less)    (R) Flex-rotate 10 reps x 10 cts ea; NE    Standing: (R) Ankle DF mob using a towel roll x 10 reps; NE    (R) Ankle eccen DF load with blackTB 20 reps x 10 eccen ct ea; NE    Step standing on 4 and 10 inch step (R) LE on lower step eccentric calf raises 10 reps x 10 eccen ct ea; NE tired    Shuttle: (R) Calf raises with 2x x 20 reps; NE    (B) calf stretch in inclined step L4 3 reps x 30 secs ea; NE    Standing: (B) UE rhytmic-stab extension with blueTB 10 sets x 10 bounces ea; NE tired     (R/L) forward stance with 4 lbs ball diagonal lift 2 sets x 10 reps ea; NE     (R) Flex-rotate x 5 reps x  10 cts ea; NE     Assessment:   Johnnie continue to respond to a lumbar (R) lateral shift in supine ((R) flex-rotate). He remain symptomfree in the lower back with WNL of AROM in all directions.  He still respond to (R) Ankle DF mobs but need to perform strengthening exercises more to maintain his improved condition.  All questions and concerns were answered and addressed at this time.     HEP:    - Supine: (R) Flex-rotate 10 reps x 10 cts 2-3x/day  - Seated (R) LE dural nerve stretch with foot on floor 5 reps x 5 cts each 1-2x/day  - Sitting posture correction with lumbar roll  - (R) Ankle DF mobilization using a towel roll under the balls of foot x 10-20 reps 2-3x/day  - (R) Ankle eccentric DF with blackTB 10 reps x 10 eccen ct ea 1-2x/day    Goals:    (12 visits)  1. Patient to consistently perform her HEP and it's progression to maintain her improved condition.   2. Patient to have reduced, centralized, and abolished symptoms in the low back to enable easier ADLs, functional, work, and recreational activities.   3. Patient to have WNL of lumbar mobility in all directions to be able to climb up stairs, sit, rise up from sitting, and run/jog/fast walk without producing or increasing symptoms in the (R) lower buttock, posterior thigh, knee, shin, and between 1st and 2nd toes.   4. Patient to consistently have good posture to promote her symptomfree condition.     Plan:   Re-assess next session and continue with directional preference exercise, posture correction, strengthening/stability exercises, patient education, and HEP progression.

## 2025-02-13 ENCOUNTER — APPOINTMENT (OUTPATIENT)
Dept: PHYSICAL THERAPY | Age: 70
End: 2025-02-13
Attending: PHYSICAL MEDICINE & REHABILITATION
Payer: MEDICARE

## 2025-02-14 ENCOUNTER — OFFICE VISIT (OUTPATIENT)
Dept: PHYSICAL THERAPY | Age: 70
End: 2025-02-14
Attending: INTERNAL MEDICINE
Payer: MEDICARE

## 2025-02-14 PROCEDURE — 97110 THERAPEUTIC EXERCISES: CPT | Performed by: PHYSICAL THERAPIST

## 2025-02-14 NOTE — PROGRESS NOTES
Diagnosis:  Lumbar radiculopathy (M54.16)  Mechanical low back pain (M54.59)  Lumbar foraminal stenosis (M48.061)  Lumbar spondylosis (M47.816)  Degeneration of intervertebral disc of lumbosacral region with discogenic back pain and lower extremity pain (M51.372)  Bulge of lumbar disc without myelopathy (M51.369)  Facet syndrome, lumbar (M47.816)  Myalgia (M79.10)   Biomechanical lesion (M99.9)        Referring Provider:  Behar, A. MD Date of Evaluation:  12/10/2024    Precautions:  None Date POC Expires: 3/10/2025     Date of Surgery: NA    Next MD visit:   none scheduled     Today's Date: 2/14/2025    Insurance Primary/Secondary: MEDICARE PART A&B/ [place]DEMN[place]   Authorized Visits: 12     Visit Number: 12    Charges: TherEx x 3  Total Timed Treatment: 44 min  Total Treatment time: 46 min    Medication Changes since last visit?: No    Subjective:   Johnnie report that he is feeling good in the back.  There is no pain/ache in the back and he has been doing all his activities with more confidence.  He would like to review the exercises that he will need to continue to maintain his improved condition.    Objective:   ROM: (Pre-test symptom: 0/10 (R) buttock/thigh)  Lumbar        Movement Loss Pain (+/-)   Flexion Nil loss                     NE     Extension Nil loss NE   R Sideglide Nil loss NE   L Sideglide Nil loss NE      Treatment:  Date: 12/12/2024  Tx#: 2/12 Date: 12/17/2024  Tx#: 3/12 Date: 12/19/2024  Tx#: 4/12 Date: 12/27/2024  Tx#: 5/12 Date: 1/3/2025  Tx#: 6/12   Scifit UE only L4 fwd/bkwd x 5 mins ea; NE postural training with lumbar roll    LIT x 10 reps; NE    - less tightness lower back lumbar flexion    LIT over rail x 10 reps; NE     - no tightness lower back flexion and increase to flexion    Pronelying x 1 min; NE    REIL sag x 10 (8+2) reps; NE (more pressure)    + belt OP x 10 reps; NE (pressure back only)    Sustained lumbar extension with belt OP at pelvis using a table/mat x 3 mins; NE  pressure back    REIL with sheet OP x 10 reps; NE    Sitting posture correction with lumbar roll; P, NW (R) foot tingling     - scoot forward 1 inch; Dec, A Scifit UE only L4 fwd/bkwd x 5 mins ea; NE postural training with lumbar roll    LIT hands on wall x 10 reps; NE    Pronelying x 1 min; NE    REIL with manual OP x 10 reps; NE pressure in back only    Prone: Alt LE lift 2 x 10 reps; NE tired    Prone: (B) UE extension 10 reps x 10 cts ea; NE tired    Prone: (B) UE h.abduction 10 reps x 10 cts ea; NE tired    Prone: (B) UE scaption 10 reps x 10 cts ea; NE tired    Prone: back extension (skydiver) 10 reps x 10 cts ea; NE tired    Hydrant (R/L) 1kg ball 5 reps x 10 cts ea; NE      Slouch-over correct x 10 reps; P, NW (R) posterior thigh    Monster walk fwd/bkwd with redTB abduction resist 3 laps x 30 feet; NE tired    LIT over rail x 10 reps; NE TM: Retro walk @ 5% grade x 1.0 mph x 10 mins; NE postural training    LIT hands on wall x 10 reps; NE    Prone: Alt LE lift 3 lbs 2 x 10 reps; NE tired    Prone: (B) UE extension 2 lbs 10 reps x 10 cts ea; NE tired    Prone: (B) UE h.abduction 1 lb 10 reps x 10 cts ea; NE tired    Prone: (B) UE scaption 1 lb 10 reps x 10 cts ea; NE tired    Prone: back extension (skydiver) 10 reps x 10 cts ea; NE tired    Prone: planks on toes and forearms 5 reps x 15 secs ea; NE tired    Monster walk fwd/bkwd with blueTB abduction resist 2 laps x 30 feet; NE    LIT hands on wall x 5 reps; NE   TM: Retro walk @ 8% grade x 1.2 mph x 10 mins; NE postural training    LIT hands on wall x 10 reps; NE    Prone: sustained lumbar extension with belt OP at pelvis using mat/table x 3+3 mins; NE    REIL with belt OP at pelvis x 10 reps; NE    Prone: (B) UE rhtymic-stab extension 1 lb 10 sets x 10 bounces ea; NE    Prone: (B) UE rhytmic-stab h.abduction 0 wt 10 sets x 10 bounces ea; NE     Prone: (B)UE rhytmic-stab scaption 0 wt 10 set x 10 bounces ea; NE    LIT hands on wall x 10 reps; NE   TM:  Retro walk @ 12% grade x 1.2 mph x 10 mins; NE postural training    LIT over rail x 10 reps; NE    (R) SGIS shoulder on wall 2 x 10 reps; P, NW (better)     + extension x 10 (8+2) reps; P, NW      (R) Flex-rotate 2 sets x 10 reps x 10 cts; P, NW (better)    + (higher knees)10 more; NE    Supine and SLY: abdominal, oblique, and TA crunches x 10 reps ea; NE tired    Supine: (R) Flex-rotate 10 reps x 10 cts ea; NE       Date: 1/29/2025  Tx#: 7/12 Date: 1/31/2025  Tx#: 8/12 Date: 2/4/2025  Tx#: 9/12 Date: 2/7/2025  Tx#: 10/12 Date: 2/11/2025  Tx#: 11/12 Date: 2/14/2025  Tx#: 12/12   TM: Retro walk @ 12% grade x 1.2 mph x 10 mins; NE postural training    (R) Flex-rotate 10 reps x 10 cts ea; NE     + Manual OP 5 reps x 10 cts ea; NE (more stretch)    Prone: planks on forearm and toes 5 reps x 10 cts ea; NE tired    Hydrant (R/L) LE 1 kg ball 5 reps x 10 cts ea; NE    Step up forward/straddle onto a 6 inch step with a greenTB abduction resist (R/L) LE lead x 20 reps ea; NE    Shuttle: (R/L) LE leg press 8b x 20 reps ea; NE    (R) Flex-rotate x 5 reps x 10 cts ea; NE TM: Retro walk @ 12% grade x 1.2 mph x 10 mins; NE postural training    (R) Flex-rotate 10 reps x 10 cts ea; NE    Seated: (R) LE dural nerve stretch with foot on floor 5 reps x 5 cts ea; P, NW tightness behind (R) knee    (R) Flex-rotate 10 reps x 10 cts ea; NE    Step up forward/straddle onto a  inch step with a blueTB abduction resist (R/L) LE lead x 20 reps ea; NE    Shuttle: (R/L) LE leg press 8b x 20 reps ea; NE TM: Retro walk @ 15% grade x 1.2 mph x 10 mins; NE postural training    (R) Flex-rotate 10 reps x 10 cts ea; NE    Seated: (R) LE dural nerve stretch with foot on floor 5 reps x 5 cts ea; P, NW tightness behind (R) knee    (R) Flex-rotate 10 reps x 10 cts ea; NE    (R) Ankle DF mob using a wedge and a towel roll 2 x 10 reps; NE    (B) UE rhytmic-stab extension blueTB 10 sets x 10 bounces ea; NE    Hydrant (R/L) LE 2 kg ball 5 reps x 10 cts ea;  NE    Monster walk fwd/bkwd with greenTB abduction resist 3 laps x 30 feet; NE tired    (B) UE OH wall walk with redTB abduction resist 10 reps x 10 cts ea; NE    (R) Flex-rotate x 5 reps x 10 cts ea; P, NW (R) heel (better)  TM: Retro walk @ 15% grade x 1.2 mph x 10 mins; NE postural training    (R) Flex-rotate 10 reps x 10 cts ea; NE    Seated: (R) LE dural nerve stretch with foot on floor 5 reps x 5 cts ea; P, NW tightness behind (R) knee (less)    (R) Flex-rotate 10 reps x 10 cts ea; NE    (R) Ankle DF mob using a towel roll x 10 reps; NE    (R) Ankle eccen DF load with blackTB 2 x 10 reps x 10 eccen ct ea; NE     (R) Hallux resisted PF with blackTB x 20 reps; NE    Prone: (B) UE rhtymic-stab extension 2 lbs 10 sets x 10 bounces ea; NE  tired    Prone: (B) UE rhytmic-stab h.abduction 2 lbs 10 sets x 10 bounces ea; NE tired    Prone: (B) UE rhytmic-stab scaption 1 lb 10 sets x 10 bounces ea; NE tired    Prone: back extension (skydiver) 10 reps x 10 cts ea; NE    Prone: plank on toes and forearm 5 reps x 10 cts ea; NE    (R/L) forward stance with 4 lbs ball diagonal lift 2 sets x 10 reps ea; NE     (R) Flex-rotate x 5 reps x 10 cts ea; NE  TM: Retro walk @ 15% grade x 1.2 mph x 10 mins; NE postural training    Seated: (R) LE dural nerve stretch with foot on floor 5 reps x 5 cts ea; P, NW tightness behind (R) knee (less)    (R) Flex-rotate 10 reps x 10 cts ea; NE    Standing: (R) Ankle DF mob using a towel roll x 10 reps; NE    (R) Ankle eccen DF load with blackTB 20 reps x 10 eccen ct ea; NE    Step standing on 4 and 10 inch step (R) LE on lower step eccentric calf raises 10 reps x 10 eccen ct ea; NE tired    Shuttle: (R) Calf raises with 2x x 20 reps; NE    (B) calf stretch in inclined step L4 3 reps x 30 secs ea; NE    Standing: (B) UE rhytmic-stab extension with blueTB 10 sets x 10 bounces ea; NE tired     (R/L) forward stance with 4 lbs ball diagonal lift 2 sets x 10 reps ea; NE     (R) Flex-rotate x 5 reps x  10 cts ea; NE TM: Retro walk @ 15% grade x 1.2 mph x 10 mins; NE postural training    Prone: Planks on toes and forearm 5 sets x 5 reps ea; NE tired    Hydrant (R/L) LE 2 kg ball 5 reps x 10 cts ea; NE tired    (B) UE rhytmic-stab extension blueTB 10 sets x 10 bounces ea; NE tired    (B) UE OH wall walk with greenTB abduction resist 10 reps x 10 cts ea; NE tired     Assessment:   Johnnie has attended 12 physical therapy sessions from 12/10/2024 to 2/14/2025.  He is symptomfree in the lower back with WNL of AROM in all directions.  He responded to a lumbar spine directional preference exercise toward (R) lateral sideglide with flexion in the unloaded position ((R) flex-rotate in supine).  He was treated with posture correction specially in sitting using a lumbar roll (recommended) and progressed with dynamic postural strengthening/stability exercises and HEP.  He remained well motivated during his therapy sessions and was issued copies of his maintenance exercises.  He feels confident that he will be able to his HEP independently and correctly.  He is now discharged from physical therapy at this time.       HEP:    - Supine: (R) Flex-rotate 10 reps x 10 cts 1-2x/day  - Sitting posture correction with lumbar roll  - Postural strengthening issued copies from previous sessions and therabands for home use    DEDRICK Score: Initial:  12; Discharge: 2    Goals: - MET   (12 visits)  1. Patient to consistently perform her HEP and it's progression to maintain her improved condition.   2. Patient to have reduced, centralized, and abolished symptoms in the low back to enable easier ADLs, functional, work, and recreational activities.   3. Patient to have WNL of lumbar mobility in all directions to be able to climb up stairs, sit, rise up from sitting, and run/jog/fast walk without producing or increasing symptoms in the (R) lower buttock, posterior thigh, knee, shin, and between 1st and 2nd toes.   4. Patient to consistently have good  posture to promote her symptomfree condition.     Plan:   Discharge from physical therapy with HEP.

## 2025-02-17 ENCOUNTER — OFFICE VISIT (OUTPATIENT)
Dept: PODIATRY CLINIC | Facility: CLINIC | Age: 70
End: 2025-02-17
Payer: MEDICARE

## 2025-02-17 ENCOUNTER — PATIENT MESSAGE (OUTPATIENT)
Dept: INTERNAL MEDICINE CLINIC | Facility: CLINIC | Age: 70
End: 2025-02-17

## 2025-02-17 DIAGNOSIS — Q66.6 VALGUS DEFORMITY OF BOTH FEET: ICD-10-CM

## 2025-02-17 DIAGNOSIS — M79.671 RIGHT FOOT PAIN: Primary | ICD-10-CM

## 2025-02-17 PROCEDURE — 99203 OFFICE O/P NEW LOW 30 MIN: CPT | Performed by: STUDENT IN AN ORGANIZED HEALTH CARE EDUCATION/TRAINING PROGRAM

## 2025-02-17 NOTE — PROGRESS NOTES
[facility] Podiatry  Progress Note      Johnnie Madden is a 69 year old male.   Chief Complaint   Patient presents with    Foot Pain     Right - onset 2 mo ago - no injury - has pain across the top of the foot rated as 2-5/10 on and off -              HPI:     Patient is a pleasant 69-year-old male presents to clinic for evaluation of right dorsal midfoot pain.  Denies any pedal injuries or sprains.  Relates that he was being treated for sciatica and completed physical therapy helped improve his sciatica however he still feels pain to right midfoot.  Patient also relates to having flatfeet patient has had custom orthotics.    Allergies: Patient has no known allergies.    Current Outpatient Medications   Medication Sig Dispense Refill    ALPRAZolam 0.25 MG Oral Tab Take 1 tablet (0.25 mg total) by mouth every 6 (six) hours as needed for Anxiety. 40 tablet 1    escitalopram 10 MG Oral Tab Take 1 tablet (10 mg total) by mouth daily. 90 tablet 1    naproxen (NAPROSYN) 500 MG Oral Tab Take 1 tablet (500 mg total) by mouth 2 (two) times daily with meals. Take for 2 weeks as directed and then as needed. 60 tablet 0    levocetirizine 5 MG Oral Tab Take 1 tablet (5 mg total) by mouth every evening.      Multiple Vitamins-Minerals (MENS MULTI VITAMIN & MINERAL) Oral Tab Take by mouth.      magnesium 30 MG Oral Tab Take 1 tablet (30 mg total) by mouth 2 (two) times daily.      Ascorbic Acid (VITAMIN C) 1000 MG Oral Tab Take 1 tablet (1,000 mg total) by mouth daily.      B Complex Vitamins (VITAMIN B COMPLEX OR) Take by mouth.      NON FORMULARY Take 2 capsules by mouth daily. LUTEIN, XANTHINE,      cyanocobalamin 1000 MCG/ML Injection Solution Inject 1 mL (1,000 mcg total) into the muscle once a week. 22 each 0    Syringe 25G X 5/8\" 3 ML Does not apply Misc Use for IM injections once monthly 50 each 0    fluticasone propionate 50 MCG/ACT Nasal Suspension 1 spray by Each Nare route daily as needed for Rhinitis.         History reviewed. No pertinent past medical history.   Past Surgical History:   Procedure Laterality Date    Colonoscopy  05/2018      Family History   Problem Relation Age of Onset    Diabetes Mother     Diabetes Brother     Hypertension Brother       Social History     Socioeconomic History    Marital status:    Tobacco Use    Smoking status: Never    Smokeless tobacco: Never   Vaping Use    Vaping status: Never Used   Substance and Sexual Activity    Alcohol use: Yes     Comment: socially    Drug use: No           REVIEW OF SYSTEMS:     Denies nause, fever, chills  No calf pain  Denies chest pain or SOB      EXAM:   There were no vitals taken for this visit.  GENERAL: well developed, well nourished, in no apparent distress  EXTREMITIES:   1. Integument: Normal skin temperature and turgor  2. Vascular: Dorsalis pedis two out of four bilateral and posterior tibial pulses two out of   four bilateral, capillary refill normal.   3. Musculoskeletal: Pain with palpation to right midfoot   4. Neurological: Normal sharp dull sensation; reflexes normal.             ASSESSMENT AND PLAN:   Diagnoses and all orders for this visit:    Right foot pain  -     XR FOOT, COMPLETE (MIN 3 VIEWS), RIGHT (CPT=73630); Future  -     Physical Therapy Referral - [facility]  -     DME - External    Valgus deformity of both feet  -     DME - External        Plan:     Patient seen and examined and findings discussed with patient.  Discussed etiology of condition along with treatment options.  Advised patient to obtain a right foot x-ray for further evaluation.  Discussed using topical Voltaren gel for arthritic pain.  Referral placed for physical therapy and custom orthotics.  Advised patient to return to clinic for discussion of x-ray results.    The patient indicates understanding of these issues and agrees to the plan.        Liz Chang DPM

## 2025-02-18 ENCOUNTER — LAB ENCOUNTER (OUTPATIENT)
Dept: LAB | Age: 70
End: 2025-02-18
Attending: INTERNAL MEDICINE
Payer: MEDICARE

## 2025-02-18 ENCOUNTER — OFFICE VISIT (OUTPATIENT)
Dept: PHYSICAL THERAPY | Age: 70
End: 2025-02-18
Attending: STUDENT IN AN ORGANIZED HEALTH CARE EDUCATION/TRAINING PROGRAM
Payer: MEDICARE

## 2025-02-18 ENCOUNTER — HOSPITAL ENCOUNTER (OUTPATIENT)
Dept: GENERAL RADIOLOGY | Age: 70
Discharge: HOME | End: 2025-02-18
Attending: STUDENT IN AN ORGANIZED HEALTH CARE EDUCATION/TRAINING PROGRAM
Payer: MEDICARE

## 2025-02-18 ENCOUNTER — APPOINTMENT (OUTPATIENT)
Dept: PHYSICAL THERAPY | Age: 70
End: 2025-02-18
Attending: PHYSICAL MEDICINE & REHABILITATION
Payer: MEDICARE

## 2025-02-18 DIAGNOSIS — Z12.5 SCREENING PSA (PROSTATE SPECIFIC ANTIGEN): ICD-10-CM

## 2025-02-18 DIAGNOSIS — E78.01 FAMILIAL HYPERCHOLESTEROLEMIA: ICD-10-CM

## 2025-02-18 DIAGNOSIS — M79.671 RIGHT FOOT PAIN: ICD-10-CM

## 2025-02-18 DIAGNOSIS — M79.671 RIGHT FOOT PAIN: Primary | ICD-10-CM

## 2025-02-18 LAB
BASOPHILS # BLD AUTO: 0.06 X10(3) UL (ref 0–0.2)
BASOPHILS NFR BLD AUTO: 1.1 %
BILIRUB UR QL: NEGATIVE
CHOLEST SERPL-MCNC: 222 MG/DL (ref ?–200)
CLARITY UR: CLEAR
COMPLEXED PSA SERPL-MCNC: 1.27 NG/ML (ref ?–4)
DEPRECATED RDW RBC AUTO: 40.2 FL (ref 35.1–46.3)
EOSINOPHIL # BLD AUTO: 0.22 X10(3) UL (ref 0–0.7)
EOSINOPHIL NFR BLD AUTO: 4.2 %
ERYTHROCYTE [DISTWIDTH] IN BLOOD BY AUTOMATED COUNT: 11.9 % (ref 11–15)
FASTING PATIENT LIPID ANSWER: NO
GLUCOSE UR-MCNC: NORMAL MG/DL
HCT VFR BLD AUTO: 44.2 %
HDLC SERPL-MCNC: 40 MG/DL (ref 40–59)
HGB BLD-MCNC: 15 G/DL
HGB UR QL STRIP.AUTO: NEGATIVE
IMM GRANULOCYTES # BLD AUTO: 0.02 X10(3) UL (ref 0–1)
IMM GRANULOCYTES NFR BLD: 0.4 %
KETONES UR-MCNC: NEGATIVE MG/DL
LDLC SERPL CALC-MCNC: 163 MG/DL (ref ?–100)
LEUKOCYTE ESTERASE UR QL STRIP.AUTO: NEGATIVE
LYMPHOCYTES # BLD AUTO: 2.31 X10(3) UL (ref 1–4)
LYMPHOCYTES NFR BLD AUTO: 43.9 %
MCH RBC QN AUTO: 31.3 PG (ref 26–34)
MCHC RBC AUTO-ENTMCNC: 33.9 G/DL (ref 31–37)
MCV RBC AUTO: 92.3 FL
MONOCYTES # BLD AUTO: 0.65 X10(3) UL (ref 0.1–1)
MONOCYTES NFR BLD AUTO: 12.4 %
NEUTROPHILS # BLD AUTO: 2 X10 (3) UL (ref 1.5–7.7)
NEUTROPHILS # BLD AUTO: 2 X10(3) UL (ref 1.5–7.7)
NEUTROPHILS NFR BLD AUTO: 38 %
NITRITE UR QL STRIP.AUTO: NEGATIVE
NONHDLC SERPL-MCNC: 182 MG/DL (ref ?–130)
PH UR: 5.5 [PH] (ref 5–8)
PLATELET # BLD AUTO: 326 10(3)UL (ref 150–450)
PROT UR-MCNC: NEGATIVE MG/DL
RBC # BLD AUTO: 4.79 X10(6)UL
SP GR UR STRIP: 1.02 (ref 1–1.03)
TRIGL SERPL-MCNC: 106 MG/DL (ref 30–149)
TSI SER-ACNC: 3.3 UIU/ML (ref 0.55–4.78)
UROBILINOGEN UR STRIP-ACNC: NORMAL
VLDLC SERPL CALC-MCNC: 21 MG/DL (ref 0–30)
WBC # BLD AUTO: 5.3 X10(3) UL (ref 4–11)

## 2025-02-18 PROCEDURE — 97162 PT EVAL MOD COMPLEX 30 MIN: CPT | Performed by: PHYSICAL THERAPIST

## 2025-02-18 PROCEDURE — 80061 LIPID PANEL: CPT

## 2025-02-18 PROCEDURE — 85025 COMPLETE CBC W/AUTO DIFF WBC: CPT

## 2025-02-18 PROCEDURE — 81003 URINALYSIS AUTO W/O SCOPE: CPT | Performed by: INTERNAL MEDICINE

## 2025-02-18 PROCEDURE — 36415 COLL VENOUS BLD VENIPUNCTURE: CPT

## 2025-02-18 PROCEDURE — 73630 X-RAY EXAM OF FOOT: CPT | Performed by: STUDENT IN AN ORGANIZED HEALTH CARE EDUCATION/TRAINING PROGRAM

## 2025-02-18 PROCEDURE — 97110 THERAPEUTIC EXERCISES: CPT | Performed by: PHYSICAL THERAPIST

## 2025-02-18 PROCEDURE — 84443 ASSAY THYROID STIM HORMONE: CPT

## 2025-02-19 NOTE — PROGRESS NOTES
LOWER EXTREMITY EVALUATION:     Diagnosis:   Right foot pain (M79.671) Patient:  Johnnie Madden (69 year old, male)        Referring Provider: Jeff Anthony D'Amico  Today's Date   2/19/2025    Precautions:  None   Date of Evaluation: 02/18/25  Next MD visit: NA  Date of Surgery: NA     PATIENT SUMMARY   Summary of chief complaints: Intermittent (R) dorsum of foot pain/ache/tightness/tingling rated 0-5/10.  History of current condition: Johnnie report that the (R) foot started hurting/aching 2 months ago at the same time his back hurt for no apparent reason.  He has PT for the back which was resolved but the (R) foot stayed the same or a little bit worse.  He saw a podiatrist and is referred to PT for evaluation and treatment.   Pain level: current 0 /10, at best 0 /10, at worst 5 /10  Description of symptoms: Intermittent (R) dorsum of foot pain/ache/tightness/tingling rated 0-5/10.   Occupation: Optometrist   Leisure activities/Hobbies: Gardening/yardwork, run/walk, go to the gym to lift free weights and use machines, watch TV sitting on good supportive chair with a lumbar roll, drives an [employer] 25-50 daily.   Prior level of function: No (R) dorsal foot pain/ache/tingling with all activities.  Current limitations: Inability to walk, climb up/down stairs, wake up in the morning, exercise his legs without producing or increasing symptoms in the (R) dorsum of foot.  Pt goals: Return to all activities without (R) dorsal foot symptoms.  Past medical history was reviewed with oJhnnie.  Significant findings include: LBP 2 months ago  Imaging/Tests: X-ray (today)   Johnnie  has no past medical history on file.  He  has a past surgical history that includes colonoscopy (05/2018).    ASSESSMENT  Johnnie presents to physical therapy evaluation with primary c/o Intermittent (R) dorsum of foot pain/ache/tightness/tingling rated 0-5/10.. The results of the objective tests and measures show Johnnie is presenting with a (R) Ankle derangement with  a directional preference toward DF in partial weight bearing.  His (R) dorsal foot symptom abolished during step up/over a 6 inch step after this directional preference exercise.. Functional deficits include but are not limited to Inability to walk, climb up/down stairs, wake up in the morning, exercise his legs without producing or increasing symptoms in the (R) dorsum of foot.. Signs and symptoms are consistent with diagnosis of Right foot pain (M79.671). Pt and PT discussed evaluation findings, pathology, POC and HEP.  Pt voiced understanding and performs HEP correctly without reported pain. Skilled Physical Therapy is medically necessary to address the above impairments and reach functional goals.    OBJECTIVE:   Musculoskeletal  Observation: (B) Flat feet (forefoot pronation)  Palpation: (-) Tenderness, (-) Warmth,     Edema: Bi-malloelar level: (R) 28 cm;  (L) 27 cm  (early evening measurment)     Ankle/Foot   ROM MMT (-/5)    R L R L     PF 60 degs 60 degs 4/5 4/5     DF (L4) 11 degs 15 degs 4/5 4/5     Inversion 45 degs * 49 degs 4-/5 4-/5     Eversion 17 degs 20 degs 4-/5 4-/5      (R) Hallux MMT PF: 4/5 (L) Hallux MMT PF: 4/5       (*) Ache upon resistance    Neurological:  Sensation: (-) Numbness     Balance and Functional Mobility:  Gait: pt ambulates on level ground with other (use comment) ((R) Foot out toeing, decreased heel-toe and  push-off pattern)     Today's Treatment and Response:   Pt education was provided on exam findings, treatment diagnosis, treatment plan, expectations, and prognosis.  Today's Treatment       2/18/2025   PT Treatment   Therapeutic Exercise Test motion (R) Ankle: Step up/over a 6 inch step with (R) leg leading; P, NW (R) dorsum of foot ache on step down and back up.    Seated: (R) Ankle DF mobs with towel OP 2 x 10 reps; NE     - less ache (R) dorsum of foot during test motion    Standing: (R) Ankle DF mobs with foot on chair 2 x 10 reps; P, NW achilles tendon  stretch/ache    - no symptom (R) dorsum of foot during test motion       Therapeutic Exercise Min 15   Evaluation Min 32   Total of Timed Procedures 15   Total of Service Based 32   Total Treatment Time 47         Patient was instructed in and issued a HEP for: Standing: (R) Ankle DF mobs with foot on chair 2 x 10 reps x 4-5x/day    Charges:  PT EVAL: Moderate Complexity, TherEx x 1  In agreement with evaluation findings and clinical rationale, this evaluation involved MODERATE COMPLEXITY decision making due to 1-2 personal factors/comorbidities, 3 or more body structures involved/activity limitations, and evolving symptoms as documented in the evaluation.                                                                                                                PLAN OF CARE:    Goals: (to be met in 8 visits)   Goals       Therapy Goals     1. Patient to consistently perform their HEP and it's progression to maintain their improved condition.  2. Patient to have reduced and abolished symptoms to to be able to walk, climb up/down stairs, wake up in the morning, exercise his legs without producing or increasing symptoms in the (R) dorsum of foot.   3. Patient to have WNL of (R) Ankle AROM in all directions with 4-5/5 MMT in all motions to promote all home, work, recreational, and functional activities without discomfort or deviation.               Frequency / Duration: Patient will be seen 2-1x/week or a total of 8  visits over a 90 day period. Treatment will include: Manual Therapy; Gait training; Therapeutic Activities; Therapeutic Exercise; Home Exercise Program instruction; Other (use comment) (Directional preference exercise, eccentric/concentric strengthening/stability exercise, patient education, and HEP progression.)    Education or treatment limitation: None   Rehab Potential: good     LEFS Score  LEFS Score: 78.75 % (2/18/2025  6:43 PM)      Patient/Family/Caregiver was advised of these findings,  precautions, and treatment options and has agreed to actively participate in planning and for this course of care.    Thank you for your referral. Please co-sign or sign and return this letter via fax as soon as possible to [phone]. If you have any questions, please contact me at Dept: [phone]    Sincerely,  Electronically signed by therapist: Billy Lawrence, PT, Cert MDT  Physician's certification required: Yes  I certify the need for these services furnished under this plan of treatment and while under my care.    X___________________________________________________ Date____________________    Certification From: 2/19/2025  To:5/20/2025

## 2025-02-19 NOTE — PATIENT INSTRUCTIONS
Patient was instructed in and issued a HEP for: Standing: (R) Ankle DF mobs with foot on chair 2 x 10 reps x 4-5x/day

## 2025-02-21 ENCOUNTER — APPOINTMENT (OUTPATIENT)
Dept: PHYSICAL THERAPY | Age: 70
End: 2025-02-21
Attending: INTERNAL MEDICINE
Payer: MEDICARE

## 2025-02-25 ENCOUNTER — TELEPHONE (OUTPATIENT)
Dept: INTERNAL MEDICINE CLINIC | Facility: CLINIC | Age: 70
End: 2025-02-25

## 2025-02-25 DIAGNOSIS — E53.8 B12 DEFICIENCY: ICD-10-CM

## 2025-02-25 DIAGNOSIS — F41.9 ANXIETY: ICD-10-CM

## 2025-02-25 RX ORDER — ALPRAZOLAM 0.25 MG
0.25 TABLET ORAL EVERY 6 HOURS PRN
Qty: 40 TABLET | Refills: 1 | Status: SHIPPED | OUTPATIENT
Start: 2025-02-25

## 2025-02-25 RX ORDER — CYANOCOBALAMIN 1000 UG/ML
1000 INJECTION, SOLUTION INTRAMUSCULAR; SUBCUTANEOUS WEEKLY
Qty: 22 EACH | Refills: 0 | Status: CANCELLED | OUTPATIENT
Start: 2025-02-25

## 2025-02-25 NOTE — TELEPHONE ENCOUNTER
Pt was given enough to last the year. After 8 weeks of weekly injections change to once monthly.     \"5. B12 deficiency  B12 injections once weekly for 8 weeks then go to every month after that, self injections \"    Rhondahart sent

## 2025-02-25 NOTE — TELEPHONE ENCOUNTER
To Dr. HITCHCOCK-    The above refill request is for a controlled substance.  Please review pended medication order.  LOV: 10/24  Prescribed: 40 with 1 1/2  : 1/2 40

## 2025-03-03 ENCOUNTER — OFFICE VISIT (OUTPATIENT)
Dept: PODIATRY CLINIC | Facility: CLINIC | Age: 70
End: 2025-03-03
Payer: MEDICARE

## 2025-03-03 DIAGNOSIS — M79.671 RIGHT FOOT PAIN: Primary | ICD-10-CM

## 2025-03-03 DIAGNOSIS — M25.571 ARTHRALGIA OF RIGHT FOOT: ICD-10-CM

## 2025-03-03 PROCEDURE — 99214 OFFICE O/P EST MOD 30 MIN: CPT | Performed by: STUDENT IN AN ORGANIZED HEALTH CARE EDUCATION/TRAINING PROGRAM

## 2025-03-03 NOTE — PROGRESS NOTES
[facility]  Progress Note      Johnnie Madden is a 69 year old male.   Chief Complaint   Patient presents with    Foot Pain     R foot f/u-  rates pain 2-7/10 on and off- here for xray result         HPI:     Patient is a pleasant 69-year-old male who presents to clinic for follow-up of right midfoot pain.  Patient relates that he had 1 session of physical therapy.  He still admits to discomfort along his midfoot.  Patient is here to discuss right foot x-ray results.    Allergies: Patient has no known allergies.    Current Outpatient Medications   Medication Sig Dispense Refill    ALPRAZolam 0.25 MG Oral Tab Take 1 tablet (0.25 mg total) by mouth every 6 (six) hours as needed for Anxiety. 40 tablet 1    escitalopram 10 MG Oral Tab Take 1 tablet (10 mg total) by mouth daily. 90 tablet 1    naproxen (NAPROSYN) 500 MG Oral Tab Take 1 tablet (500 mg total) by mouth 2 (two) times daily with meals. Take for 2 weeks as directed and then as needed. 60 tablet 0    levocetirizine 5 MG Oral Tab Take 1 tablet (5 mg total) by mouth every evening.      Multiple Vitamins-Minerals (MENS MULTI VITAMIN & MINERAL) Oral Tab Take by mouth.      magnesium 30 MG Oral Tab Take 1 tablet (30 mg total) by mouth 2 (two) times daily.      Ascorbic Acid (VITAMIN C) 1000 MG Oral Tab Take 1 tablet (1,000 mg total) by mouth daily.      B Complex Vitamins (VITAMIN B COMPLEX OR) Take by mouth.      NON FORMULARY Take 2 capsules by mouth daily. LUTEIN, XANTHINE,      cyanocobalamin 1000 MCG/ML Injection Solution Inject 1 mL (1,000 mcg total) into the muscle once a week. 22 each 0    Syringe 25G X 5/8\" 3 ML Does not apply Misc Use for IM injections once monthly 50 each 0    fluticasone propionate 50 MCG/ACT Nasal Suspension 1 spray by Each Nare route daily as needed for Rhinitis.        History reviewed. No pertinent past medical history.   Past Surgical History:   Procedure Laterality Date    Colonoscopy  05/2018      Family History    Problem Relation Age of Onset    Diabetes Mother     Diabetes Brother     Hypertension Brother       Social History     Socioeconomic History    Marital status:    Tobacco Use    Smoking status: Never    Smokeless tobacco: Never   Vaping Use    Vaping status: Never Used   Substance and Sexual Activity    Alcohol use: Yes     Comment: socially    Drug use: No           REVIEW OF SYSTEMS:     Denies nause, fever, chills  No calf pain  Denies chest pain or SOB      EXAM:   There were no vitals taken for this visit.  GENERAL: well developed, well nourished, in no apparent distress  EXTREMITIES:   1. Integument: Normal skin temperature and turgor  2. Vascular: Dorsalis pedis two out of four bilateral and posterior tibial pulses two out of   four bilateral, capillary refill normal.   3. Musculoskeletal: All muscle groups are graded 5 out of 5 in the foot and ankle.  Tenderness palpation to right midfoot.   4. Neurological: Normal sharp dull sensation; reflexes normal.      XR FOOT, COMPLETE (MIN 3 VIEWS), RIGHT (CPT=73630)    Result Date: 2/21/2025  CONCLUSION:  1.  Enthesophyte in the posterior calcaneus at the Achilles tendon insertion.  Calcaneal spur. 2.  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 3.  No acute osseous abnormality of the right foot.  Dictated by (CST): Mason Marley MD on 2/21/2025 at 7:40 AM     Finalized by (CST): Mason Marley MD on 2/21/2025 at 7:41 AM            ASSESSMENT AND PLAN:   Diagnoses and all orders for this visit:    Right foot pain    Arthralgia of right foot        Plan:     Patient seen and examined and findings discussed with patient in detail.  Discussed right foot x-rays which showed enthesophyte of the posterior calcaneus at the Achilles tendon insertion as well as mild osteoarthritis along the dorsal margin of the talonavicular joint and first MPJ joint.  There are no fractures or dislocations.   Discussed with patient conservative treatment and surgical treatment options.  Conservatively we discussed over-the-counter Voltaren gel, anti-inflammatories or the possibility of cortisone injection.  Conservatively we also discussed continuing with physical therapy, swimming and activity modifications.  Patient wishes to proceed with conservative treatment at this time.  Return to clinic as needed.    The patient indicates understanding of these issues and agrees to the plan.        Liz Chang DPM

## 2025-03-12 ENCOUNTER — TELEPHONE (OUTPATIENT)
Dept: INTERNAL MEDICINE CLINIC | Facility: CLINIC | Age: 70
End: 2025-03-12

## 2025-03-12 DIAGNOSIS — B02.9 HERPES ZOSTER WITHOUT COMPLICATION: Primary | ICD-10-CM

## 2025-03-12 NOTE — TELEPHONE ENCOUNTER
Patient is in severe pain. He has shingles. Patient wants to know if doctor on call can prescribe a strong pain mediation.Patient has not slept in 3 days due to pain

## 2025-03-12 NOTE — TELEPHONE ENCOUNTER
Not prescribing pain meds w/o seeing him.    Also I don't understand his issue with Valtrex.  He has normal renal function (though 2 weeks is out of the therapeutic window for treatment with Valtrex)

## 2025-03-12 NOTE — TELEPHONE ENCOUNTER
To  - called patient who started with Rash Saturday ,neuralgia for 2 weeks under right arm -on the back up to shoulder blade -spouse is an RN and told patient it is the shingles - he does not want to take valcyclovir due to kidneys- he is an optometrist   He is a back sleeper and wants some pain medication so he can sleep - MD can call patient to talk to him

## 2025-03-13 RX ORDER — GABAPENTIN 300 MG/1
CAPSULE ORAL NIGHTLY
Qty: 40 CAPSULE | Refills: 0 | Status: SHIPPED | OUTPATIENT
Start: 2025-03-13 | End: 2025-04-01

## 2025-03-13 NOTE — TELEPHONE ENCOUNTER
Noted, called patient, no answer, left him a message, sent in gabapentin 300 mg capsules for nighttime, taking 1-2 of those at night should at least help him sleep through the night, I always like the idea of Valtrex here, also could use some steroids, but would need to speak to me about that.  Offered him virtual visit tomorrow morning, call back to the office and schedule if he calls back .

## 2025-03-18 ENCOUNTER — APPOINTMENT (OUTPATIENT)
Dept: PHYSICAL THERAPY | Age: 70
End: 2025-03-18
Attending: STUDENT IN AN ORGANIZED HEALTH CARE EDUCATION/TRAINING PROGRAM
Payer: MEDICARE

## 2025-03-18 NOTE — TELEPHONE ENCOUNTER
Nursing, I did not see response here from the patient, or ever get a hold of him, can we check on him later in the week and see how he is doing.  Give me some update.

## 2025-03-25 ENCOUNTER — OFFICE VISIT (OUTPATIENT)
Dept: PHYSICAL THERAPY | Age: 70
End: 2025-03-25
Attending: STUDENT IN AN ORGANIZED HEALTH CARE EDUCATION/TRAINING PROGRAM
Payer: MEDICARE

## 2025-03-25 PROCEDURE — 97110 THERAPEUTIC EXERCISES: CPT | Performed by: PHYSICAL THERAPIST

## 2025-03-25 NOTE — PROGRESS NOTES
Patient: Johnnie Madden (69 year old, male) Referring Provider:  Insurance:   Diagnosis: Right foot pain (M79.671) [employer] [place] [employer] [place]DE[place]   Date of Surgery: NA Next MD visit:  MEDICARE   Precautions:  None NA Referral Information:    Date of Evaluation: Req: 0, Auth: 0, Exp:     02/18/25 POC Auth Visits:  8       Today's Date   3/25/2025    Subjective  Johnnie report that he was hit hard by Shingles in the past 3 weeks.  He was not able to do his exercises because he was in a lot of pain in the (R) shoulder, armpit, and chest area.  He felt very deconditioned and weak.  He is just starting to feel better from the Shingles and is now restart his (R) foot and back exercises.  Before his Shingles attack he was able to run outside and workout in the gym.  He had stop everything and was laying down on his bed for all the time and started feeling an ache in the lower back.       Pain: 0/10     Objective  (R) Ankle AROM: WNL in all directions with 4/5 MMT in all motions.                                                              Lumbar AROM: Nil loss in all directions without symptoms produced in the back.       Assessment  Johnnie continue to respond to dorsiflexion directional preference exercise and maintain with lumbar extension directional preference for falguni lower back.  He remained symptomfree after his session. He was advised to take his exercises easy first and not to overdo his exercises since it's his first time back to PT.  He required minimal cueing to correct his exercise technique and posture.  He understood and agreed with the treatment plan.  All questions were answered at this time.    Goals (to be met in 8 visits)   1. Patient to consistently perform their HEP and it's progression to maintain their improved condition.  2. Patient to have reduced and abolished symptoms to to be able to walk, climb up/down stairs, wake up in the morning, exercise his legs without producing or increasing symptoms  in the (R) dorsum of foot.   3. Patient to have WNL of (R) Ankle AROM in all directions with 4-5/5 MMT in all motions to promote all home, work, recreational, and functional activities without discomfort or deviation.      Plan  Re-assess next session and continue with directional preference exercise for the (R) Ankle and back, strengthening (eccentric/concentric), posture correction, postural strengthening, patient education, and HEP progression.    Treatment Last 4 Visits       2/18/2025 3/25/2025   PT Treatment   Treatment Day  2   Therapeutic Exercise Test motion (R) Ankle: Step up/over a 6 inch step with (R) leg leading; P, NW (R) dorsum of foot ache on step down and back up.    Seated: (R) Ankle DF mobs with towel OP 2 x 10 reps; NE     - less ache (R) dorsum of foot during test motion    Standing: (R) Ankle DF mobs with foot on chair 2 x 10 reps; P, NW achilles tendon stretch/ache    - no symptom (R) dorsum of foot during test motion        Therapeutic Exercise Min 15    Evaluation Min 32    Total of Timed Procedures 15    Total of Service Based 32    Total Treatment Time 47           3/25/2025   LE Treatment   Treatment Day 2   Therapeutic Exercise TM: Retro walk @ 5% grade x 1.0 mph x 10 mins; NE    LIT over rail x 10 reps; NE    Seated: (R) Ankle DF mobs with towel OP x 10 reps; NE     + eccentric strengthening with blackTB 10 reps x 10 eccen ct ea; NE     Step standing leaning on wall with (R) foot/ankle on 4 inch step x 10 reps; NE    Monster walk fwd/bkwd with greenTB abd resist 3 laps x 30 feet; NE tired    Hydrant (R/L) LE with 1 kg ball 8 reps x 10 cts ea; NE tired    (B) UE n.row greenTB x 10 reps x 10 cts ea; NE tired    (B) UE extension, w.row greenTB 5 reps x 10 cts ea; NE tired    LIT over rail x 10 reps; NE   Therapeutic Exercise Minutes 41   Total Time Of Timed Procedures 41   Total Time Of Service-Based Procedures 0   Total Treatment Time 42   HEP LIT over rail x 10 reps x  1-2x/day  Seated: (R) Ankle DF mobs with towel OP x 10 reps x 1-2x/day  + eccentric strengthening with blackTB 10 reps x 10 eccen ct ea x 1-2x/day         HEP  LIT over rail x 10 reps x 1-2x/day  Seated: (R) Ankle DF mobs with towel OP x 10 reps x 1-2x/day  + eccentric strengthening with blackTB 10 reps x 10 eccen ct ea x 1-2x/day     Charges     TherEx x 3

## 2025-03-28 ENCOUNTER — OFFICE VISIT (OUTPATIENT)
Dept: PHYSICAL THERAPY | Age: 70
End: 2025-03-28
Attending: PHYSICAL MEDICINE & REHABILITATION
Payer: MEDICARE

## 2025-03-28 PROCEDURE — 97110 THERAPEUTIC EXERCISES: CPT | Performed by: PHYSICAL THERAPIST

## 2025-03-28 NOTE — PROGRESS NOTES
Patient: Johnnie Madden (69 year old, male) Referring Provider:  Insurance:   Diagnosis: Right foot pain (M79.671) No ref. provider found  [employer] [place]DEMN[place]   Date of Surgery: NA Next MD visit:  MEDICARE   Precautions:  None NA Referral Information:    Date of Evaluation: Req: 0, Auth: 0, Exp:     02/18/25 POC Auth Visits:  8       Today's Date   3/28/2025    Subjective  Johnnie state that he does not have any symptoms in the (R) ankle/foot.  He has been doing his (R) Ankle and back exercises regularly.       Pain: 0/10     Objective  (R) Ankle AROM: WNL in all directions with 4/5 MMT in all motions.                                                              Lumbar AROM: Nil loss in all directions without symptoms produced in the back.       Assessment  Johnnie was instructed to perform (R) ankle DF mobs on a chair (partial weight bearing).  He reported (R) knee pain while doing the DF mobs on the floor (full weight bearing).  He was slowly progressed with (R) ankle/calf strengthening.  He did not report any pain ache in the (R) foot/ankle.  He required minimal cueing to correct his form and exercise techique.  All questions were answered at this time.    Goals (to be met in 8 visits)   1. Patient to consistently perform their HEP and it's progression to maintain their improved condition.  2. Patient to have reduced and abolished symptoms to to be able to walk, climb up/down stairs, wake up in the morning, exercise his legs without producing or increasing symptoms in the (R) dorsum of foot.   3. Patient to have WNL of (R) Ankle AROM in all directions with 4-5/5 MMT in all motions to promote all home, work, recreational, and functional activities without discomfort or deviation.       Plan  Re-assess next session and continue with directional preference exercise for the (R) Ankle and back, strengthening (eccentric/concentric), posture correction, postural strengthening, patient education, and HEP progression.    Treatment  Last 4 Visits  Treatment Day: 3       3/25/2025 3/28/2025   LE Treatment   Therapeutic Exercise TM: Retro walk @ 5% grade x 1.0 mph x 10 mins; NE    LIT over rail x 10 reps; NE    Seated: (R) Ankle DF mobs with towel OP x 10 reps; NE     + eccentric strengthening with blackTB 10 reps x 10 eccen ct ea; NE     Step standing leaning on wall with (R) foot/ankle on 4 inch step x 10 reps; NE    Monster walk fwd/bkwd with greenTB abd resist 3 laps x 30 feet; NE tired    Hydrant (R/L) LE with 1 kg ball 8 reps x 10 cts ea; NE tired    (B) UE n.row greenTB x 10 reps x 10 cts ea; NE tired    (B) UE extension, w.row greenTB 5 reps x 10 cts ea; NE tired    LIT over rail x 10 reps; NE TM: Retro walk @ 15% grade x 1.2 mph x 10 mins; NE    LIT over rail x 10 reps; NE    Seated: (R) Ankle DF mobs with towel OP x 10 reps; NE    Standing: (R) Ankle DF mobs on chair x 10 reps; NE    Seated: (R) Ankle eccentric DF with blackTB x 10 reps x 10 eccen ct ea; NE     Step standing leaning on wall x 10 reps; NE    Monster walk fwd/bkwd  with redTB 3 laps x 30 feet; NE tired    (R) LE SLStance with 2-1 lb medial reach tap onto a foam roll 2 x 10 reps; NE tired    Standing: (R) Ankle DF mobs on chair (partial weight bearing) x 10 reps; NE   Therapeutic Exercise Minutes 41 46   Total Time Of Timed Procedures 41 46   Total Time Of Service-Based Procedures 0 0   Total Treatment Time 42 47   HEP LIT over rail x 10 reps x 1-2x/day  Seated: (R) Ankle DF mobs with towel OP x 10 reps x 1-2x/day  + eccentric strengthening with blackTB 10 reps x 10 eccen ct ea x 1-2x/day  LIT over rail x 10 reps x 1-2x/day  Standing: (R) Ankle DF mobs on chair x 10 reps x 1-2x/day  Seated: (R) Ankle eccentric DF with blackTB x 10 reps x 10 eccen ct ea x 1-2x/day             HEP  LIT over rail x 10 reps x 1-2x/day  Standing: (R) Ankle DF mobs on chair x 10 reps x 1-2x/day  Seated: (R) Ankle eccentric DF with blackTB x 10 reps x 10 eccen ct ea x 1-2x/day          Charges     TherEx x 3

## 2025-04-01 ENCOUNTER — OFFICE VISIT (OUTPATIENT)
Dept: PHYSICAL THERAPY | Age: 70
End: 2025-04-01
Attending: STUDENT IN AN ORGANIZED HEALTH CARE EDUCATION/TRAINING PROGRAM
Payer: MEDICARE

## 2025-04-01 ENCOUNTER — OFFICE VISIT (OUTPATIENT)
Dept: PHYSICAL MEDICINE AND REHAB | Facility: CLINIC | Age: 70
End: 2025-04-01
Payer: MEDICARE

## 2025-04-01 VITALS
HEART RATE: 67 BPM | OXYGEN SATURATION: 95 % | WEIGHT: 172 LBS | HEIGHT: 66 IN | SYSTOLIC BLOOD PRESSURE: 124 MMHG | BODY MASS INDEX: 27.64 KG/M2 | DIASTOLIC BLOOD PRESSURE: 68 MMHG

## 2025-04-01 DIAGNOSIS — M99.9 BIOMECHANICAL LESION: ICD-10-CM

## 2025-04-01 DIAGNOSIS — M79.10 MYALGIA: ICD-10-CM

## 2025-04-01 DIAGNOSIS — M67.919 TENDINOPATHY OF ROTATOR CUFF, UNSPECIFIED LATERALITY: ICD-10-CM

## 2025-04-01 DIAGNOSIS — M47.816 LUMBAR SPONDYLOSIS: ICD-10-CM

## 2025-04-01 DIAGNOSIS — M54.59 MECHANICAL LOW BACK PAIN: Primary | ICD-10-CM

## 2025-04-01 DIAGNOSIS — M48.061 LUMBAR FORAMINAL STENOSIS: ICD-10-CM

## 2025-04-01 DIAGNOSIS — G89.29 CHRONIC RIGHT SHOULDER PAIN: ICD-10-CM

## 2025-04-01 DIAGNOSIS — M25.511 CHRONIC RIGHT SHOULDER PAIN: ICD-10-CM

## 2025-04-01 DIAGNOSIS — M54.2 TRIGGER POINT OF NECK: ICD-10-CM

## 2025-04-01 PROCEDURE — 97110 THERAPEUTIC EXERCISES: CPT | Performed by: PHYSICAL THERAPIST

## 2025-04-01 PROCEDURE — 99214 OFFICE O/P EST MOD 30 MIN: CPT | Performed by: PHYSICAL MEDICINE & REHABILITATION

## 2025-04-01 NOTE — PATIENT INSTRUCTIONS
1) Please get X-rays of the RIGHT shoulder today on your way out.   2) Tylenol 500-1000 mg every 6-8 hours as needed for pain.  No more than 3000 mg daily.  3)  Ice 20 minutes at a time 3-4 times per day  4) Take Ibuprofen 600 mg 1 tablet every 6-8 hours with food for the next two weeks and then as needed but no more than 4 tablets per day. Do not take with any other NSAIDS (Diclofenac, Meloxicam, Naprosyn, Advil, Aleve, etc). OK to take Tylenol 500 mg every 6 hours as needed for pain. If you develop any side effects including stomach aches, nausea, vomiting, or other gastrointestinal symptoms, stop the medication and call my office.   5) Please begin physical therapy as soon as possible. This can also be with Billy focusing on the right shoulder and low back  6) Lets touch base in about 2 months. If symptoms int he right shoulder continue, then would recommend right subacromial CSI. If subacromial CSI is not helpful enough, then would recommend right GH CSI for osteoarthritis.

## 2025-04-01 NOTE — PROGRESS NOTES
The following individual(s) verbally consented to be recorded using ambient AI listening technology and understand that they can each withdraw their consent to this listening technology at any point by asking the clinician to turn off or pause the recording:    Patient name: Johnnie Madden  Additional names:

## 2025-04-01 NOTE — PROGRESS NOTES
[facility]  Progress Note    CHIEF COMPLAINT:    Chief Complaint   Patient presents with    Follow - Up     LOV 2/4/25. F/U for low back pain radiating down R leg. Pain 1/10. Denies N/T. Admits some lower body weakness. PT has been a huge improvement. Pt is down to 4 advil daily, was taking 12 daily at peak, tylenol doesn't help. Pt saw podiatrist about hammer toe, PT also fixed pain in R foot.        History of Present Illness  The patient, with a history of partial rotator cuff tear, carpal tunnel syndrome, and radiculopathy due to an old C6 injury, presents with recurrent shoulder pain. He initially experienced pain under the right armpit three years ago, which eventually restricted his range of motion. After successful physical therapy, he regained normal function. However, a year later, he developed numbness in his fingers, which was attributed to the old C6 injury and carpal tunnel syndrome. After another successful round of physical therapy, he began experiencing pain in his buttocks when running. Recently, after starting weight lifting due to feeling better, he developed shoulder pain again, with radiating pain across his chest and over his scapula. He also had a recent bout of shingles, which he initially thought was the cause of the radiating pain. Currently, he still experiences pain under his armpit and stiffness in his back, along with an unspecified sensation behind his knee. He is concerned about whether his recent inactivity due to shingles or an injury from weight lifting is causing his current symptoms.  He rates his right shoulder pain a RIGHT about a 1 out of 10.  He denies any further radicular symptoms.  He has also been following up with podiatry regarding his hammertoe and has been in physical therapy for this with [facility] as well.  He has been taking ibuprofen for pain and will occasionally take gabapentin at nighttime which helps him sleep.   This was initially prescribed for his shingles.       PAST MEDICAL HISTORY:  History reviewed. No pertinent past medical history.    SURGICAL HISTORY:  Past Surgical History:   Procedure Laterality Date    Colonoscopy  05/2018       SOCIAL HISTORY:   Social History     Occupational History    Not on file   Tobacco Use    Smoking status: Never    Smokeless tobacco: Never   Vaping Use    Vaping status: Never Used   Substance and Sexual Activity    Alcohol use: Yes     Comment: socially    Drug use: No    Sexual activity: Not on file       FAMILY HISTORY:   Family History   Problem Relation Age of Onset    Diabetes Mother     Diabetes Brother     Hypertension Brother        CURRENT MEDICATIONS:   Current Outpatient Medications   Medication Sig Dispense Refill    ALPRAZolam 0.25 MG Oral Tab Take 1 tablet (0.25 mg total) by mouth every 6 (six) hours as needed for Anxiety. 40 tablet 1    escitalopram 10 MG Oral Tab Take 1 tablet (10 mg total) by mouth daily. 90 tablet 1    levocetirizine 5 MG Oral Tab Take 1 tablet (5 mg total) by mouth every evening.      Multiple Vitamins-Minerals (MENS MULTI VITAMIN & MINERAL) Oral Tab Take by mouth.      magnesium 30 MG Oral Tab Take 1 tablet (30 mg total) by mouth 2 (two) times daily.      Ascorbic Acid (VITAMIN C) 1000 MG Oral Tab Take 1 tablet (1,000 mg total) by mouth daily.      NON FORMULARY Take 2 capsules by mouth daily. LUTEIN, XANTHINE,      cyanocobalamin 1000 MCG/ML Injection Solution Inject 1 mL (1,000 mcg total) into the muscle once a week. 22 each 0    Syringe 25G X 5/8\" 3 ML Does not apply Misc Use for IM injections once monthly 50 each 0    fluticasone propionate 50 MCG/ACT Nasal Suspension 1 spray by Each Nare route daily as needed for Rhinitis.         ALLERGIES:   Allergies[1]    REVIEW OF SYSTEMS:   Review of Systems   Constitutional: Negative.    HENT: Negative.    Eyes: Negative.    Respiratory: Negative.    Cardiovascular: Negative.    Gastrointestinal:  Negative.    Genitourinary: Negative.    Musculoskeletal: As per HPI  Skin: Negative.    Neurological: As per HPI  Endo/Heme/Allergies: Negative.    Psychiatric/Behavioral: Negative.      All other systems reviewed and are negative. Pertinent positives and negatives noted in the HPI.    PHYSICAL EXAM:   /68   Pulse 67   Ht 66\"   Wt 172 lb (78 kg)   SpO2 95%   BMI 27.76 kg/m²     Body mass index is 27.76 kg/m².      General: No immediate distress  Head: Normocephalic/ Atraumatic  Eyes: Extra-occular movements intact.   Ears: No auricular hematoma or deformities  Mouth: No lesions or ulcerations  Heart: peripheral pulses intact. Normal capillary refill.   Lungs: Non-labored respirations  Abdomen: No abdominal guarding  Extremities: No lower extremity edema bilaterally   Skin: No lesions noted.   Cognition: alert & oriented x 3, attentive, able to follow 2 step commands, comprehension intact, spontaneous speech intact  Motor:    Musculoskeletal:    SHOULDER:  Inspection: no erythema, swelling, or obvious deformity.  No AC joint deformity  Palpation: no ttp over clavical, AC joint, or scapular spine.  Tender to palpation over the right subacromial space and greater tuberosity  ROM: Full active range of motion of the right shoulder with pain during abduction and internal rotation  Strength: Out of 5 in all myotomes of the upper extremities  Sensation: Intact to light touch in all dermatomes of the upper extremities  Reflexes: 2/4 at C5, C6, C7  Impingement Testing: Positive  Hawkin's test: Positive   Cross Arm Test: negative for AC joint pain  Speed's Test: Positive on the right  Yergason Test: negative for biceps tendon pain  Empty Can Test: Positive on the right  Push Off Test: Positive on the right        Data  [facility] Lab Encounter on 02/18/2025   Component Date Value Ref Range Status    WBC 02/18/2025 5.3  4.0 - 11.0 x10(3) uL Final    RBC 02/18/2025 4.79  3.80 - 5.80 x10(6)uL Final    HGB 02/18/2025 15.0   13.0 - 17.5 g/dL Final    HCT 02/18/2025 44.2  39.0 - 53.0 % Final    MCV 02/18/2025 92.3  80.0 - 100.0 fL Final    MCH 02/18/2025 31.3  26.0 - 34.0 pg Final    MCHC 02/18/2025 33.9  31.0 - 37.0 g/dL Final    RDW-SD 02/18/2025 40.2  35.1 - 46.3 fL Final    RDW 02/18/2025 11.9  11.0 - 15.0 % Final    PLT 02/18/2025 326.0  150.0 - 450.0 10(3)uL Final    Neutrophil Absolute Prelim 02/18/2025 2.00  1.50 - 7.70 x10 (3) uL Final    Neutrophil Absolute 02/18/2025 2.00  1.50 - 7.70 x10(3) uL Final    Lymphocyte Absolute 02/18/2025 2.31  1.00 - 4.00 x10(3) uL Final    Monocyte Absolute 02/18/2025 0.65  0.10 - 1.00 x10(3) uL Final    Eosinophil Absolute 02/18/2025 0.22  0.00 - 0.70 x10(3) uL Final    Basophil Absolute 02/18/2025 0.06  0.00 - 0.20 x10(3) uL Final    Immature Granulocyte Absolute 02/18/2025 0.02  0.00 - 1.00 x10(3) uL Final    Neutrophil % 02/18/2025 38.0  % Final    Lymphocyte % 02/18/2025 43.9  % Final    Monocyte % 02/18/2025 12.4  % Final    Eosinophil % 02/18/2025 4.2  % Final    Basophil % 02/18/2025 1.1  % Final    Immature Granulocyte % 02/18/2025 0.4  % Final    TSH 02/18/2025 3.300  0.550 - 4.780 uIU/mL Final    Cholesterol, Total 02/18/2025 222 (H)  <200 mg/dL Final    HDL Cholesterol 02/18/2025 40  40 - 59 mg/dL Final    Triglycerides 02/18/2025 106  30 - 149 mg/dL Final    LDL Cholesterol 02/18/2025 163 (H)  <100 mg/dL Final    VLDL 02/18/2025 21  0 - 30 mg/dL Final    Non HDL Chol 02/18/2025 182 (H)  <130 mg/dL Final    Patient Fasting for Lipid? 02/18/2025 No   Final    Prostate Specific Antigen Screen  02/18/2025 1.27  <=4.00 ng/mL Final   Office Visit on 10/08/2024   Component Date Value Ref Range Status    Ventricular rate 10/08/2024 76  BPM Final    Atrial rate 10/08/2024 76  BPM Final    P-R Interval 10/08/2024 182  ms Final    QRS Duration 10/08/2024 92  ms Final    Q-T Interval 10/08/2024 398  ms Final    QTC Calculation (Bezet) 10/08/2024 447  ms Final    P Axis 10/08/2024 66  degrees  Final    R Axis 10/08/2024 26  degrees Final    T Axis 10/08/2024 56  degrees Final    Urine Color 02/18/2025 Light-Yellow  Yellow Final    Clarity Urine 02/18/2025 Clear  Clear Final    Spec Gravity 02/18/2025 1.017  1.005 - 1.030 Final    Glucose Urine 02/18/2025 Normal  Normal mg/dL Final    Bilirubin Urine 02/18/2025 Negative  Negative Final    Ketones Urine 02/18/2025 Negative  Negative mg/dL Final    Blood Urine 02/18/2025 Negative  Negative Final    pH Urine 02/18/2025 5.5  5.0 - 8.0 Final    Protein Urine 02/18/2025 Negative  Negative mg/dL Final    Urobilinogen Urine 02/18/2025 Normal  Normal Final    Nitrite Urine 02/18/2025 Negative  Negative Final    Leukocyte Esterase Urine 02/18/2025 Negative  Negative Final    Microscopic 02/18/2025 Microscopic not indicated   Final   [facility] Lab Encounter on 10/05/2024   Component Date Value Ref Range Status    Testosterone Tot LC/MS 10/05/2024 476.1  264.0 - 916.0 ng/dL Final    Testost % Free+Weak Bnd 10/05/2024 15.1  9.0 - 46.0 % Final    Testost Free+Weak Bnd 10/05/2024 71.9  40.0 - 250.0 ng/dL Final    Sex Horm Bind Glob 10/05/2024 47.5  19.3 - 76.4 nmol/L Final    Glucose 10/05/2024 101 (H)  70 - 99 mg/dL Final    Sodium 10/05/2024 142  136 - 145 mmol/L Final    Potassium 10/05/2024 4.1  3.5 - 5.1 mmol/L Final    Chloride 10/05/2024 109  98 - 112 mmol/L Final    CO2 10/05/2024 25.0  21.0 - 32.0 mmol/L Final    Anion Gap 10/05/2024 8  0 - 18 mmol/L Final    BUN 10/05/2024 13  9 - 23 mg/dL Final    Creatinine 10/05/2024 0.86  0.70 - 1.30 mg/dL Final    BUN/CREA Ratio 10/05/2024 15.1  10.0 - 20.0 Final    Calcium, Total 10/05/2024 9.1  8.7 - 10.4 mg/dL Final    Calculated Osmolality 10/05/2024 294  275 - 295 mOsm/kg Final    eGFR-Cr 10/05/2024 93  >=60 mL/min/1.73m2 Final    ALT 10/05/2024 27  10 - 49 U/L Final    AST 10/05/2024 26  <34 U/L Final    Alkaline Phosphatase 10/05/2024 82  45 - 117 U/L Final    Bilirubin, Total 10/05/2024 1.0  0.2 - 1.1 mg/dL Final     Total Protein 10/05/2024 7.5  5.7 - 8.2 g/dL Final    Albumin 10/05/2024 4.3  3.2 - 4.8 g/dL Final    Globulin  10/05/2024 3.2  2.0 - 3.5 g/dL Final    A/G Ratio 10/05/2024 1.3  1.0 - 2.0 Final    Patient Fasting for CMP? 10/05/2024 Yes   Final    HgbA1C 10/05/2024 5.5  <5.7 % Final    Estimated Average Glucose 10/05/2024 111  68 - 126 mg/dL Final   ]      Radiology Imaging:  NA  ASSESSMENT AND PLAN:  Johnnie is a pleasant 69-year-old male presents with right shoulder pain which I believe is due to rotator cuff tendinopathy and glenohumeral osteoarthritis.  I have ordered new x-rays of the right shoulder.  He did have x-rays as well as an MRI of the right shoulder performed at [facility] in 2022.  I reviewed those reports via care everywhere.  He will begin formal physical therapy and continue ibuprofen.  He can also use ice and heat.  He will follow-up with me in about 2 months.  If his symptoms continue, then we can consider a subacromial injection.  If the subacromial injection is not helpful, then we can consider a glenohumeral injection given his osteoarthritis and rotator cuff arthropathy.  He can also continue ibuprofen and gabapentin.     Assessment & Plan  Rotator cuff tendinopathy and arthritis  Shoulder pain rated 1-2/10 with cracking on movement. Partial rotator cuff tear and arthritis from 2022 MRI. Symptoms suggest exacerbation of tendinopathy and arthritis, likely due to recent physical activity and inactivity post-shingles. Needle-averse, prefers non-invasive management. Prefers physical therapy over subacromial or glenohumeral joint injections. Advised on anti-inflammatory use and ice application.  - Order x-ray of the right shoulder to assess for changes since the 2022 MRI.  - Restart physical therapy focusing on the shoulder, back, and neck.  - Continue ibuprofen for pain management.  - Apply ice to the affected area.  - Re-evaluate in two months if symptoms persist.  - Consider subacromial  corticosteroid injection if symptoms continue.  - Consider glenohumeral joint injection if subacromial injection is not effective.    Shingles (Herpes Zoster)  Recent shingles episode with healing rash. Residual pain under the arm and back stiffness likely due to inactivity during shingles. Discussed gabapentin for sleep, not for neuropathic pain, as current pain is inflammatory.  - Monitor for resolution of symptoms as the rash heals.  - Encourage gradual return to physical activity to improve posture and alleviate stiffness.      RTC in 2 months  Discharge Instructions were provided as documented in AVS summary.  The patient was in agreement with the assessment and plan.  All questions were answered.  There were no barriers to learning.         1. Mechanical low back pain    2. Lumbar foraminal stenosis    3. Lumbar spondylosis    4. Trigger point of neck    5. Chronic right shoulder pain    6. Tendinopathy of rotator cuff, unspecified laterality    7. Myalgia    8. Biomechanical lesion        Alex B. Behar MD  Physical Medicine and Rehabilitation/Sports Medicine  [address] Cures Act Notice to Patient: Medical documents like this are made available to patients in the interest of transparency. However, be advised this is a medical document and it is intended as [id number] communication between your medical providers. This medical document may contain abbreviations, assessments, medical data, and results or other terms that are unfamiliar. Medical documents are intended to carry relevant information, facts as evident, and the clinical opinion of the practitioner. As such, this medical document may be written in language that appears blunt or direct. You are encouraged to contact your medical provider and/or [facility] Experience if you have any questions about this medical document.   Abridge tool was used for dictation purposes only and the patient was not recorded  at any point during the visit.              [1] No Known Allergies

## 2025-04-02 NOTE — PROGRESS NOTES
Patient: Johnnie Madden (69 year old, male) Referring Provider:  Insurance:   Diagnosis: Mechanical low back pain (M54.59)  Lumbar foraminal stenosis (M48.061)  Lumbar spondylosis (M47.816)  Trigger point of neck (M54.2)  Chronic right shoulder pain (M25.511,G89.29)  Tendinopathy of rotator cuff, unspecified laterality (M67.919) (New Rx 4/1/2025) No ref. provider found  [employer] [place]   Date of Surgery: NA Next MD visit:  MEDICARE   Precautions:  None NA Referral Information:    Date of Evaluation: Req: 0, Auth: 0, Exp:     02/18/25 POC Auth Visits:  8       Today's Date   4/1/2025    Subjective  Johnnie report that he (R) Ankle/Foot is not bothering him at this time.  He saw the doctor this morning and was given a new prescription for the back, neck, and (R) shoulder.       Pain: 0/10     Objective  (R) Ankle AROM: WNL in all directions with 4/5 MMT in all motions.                                                              Lumbar AROM: Nil loss in all directions without symptoms produced in the back.                                 (R) Shoulder: AROM: Flexion: Nil loss; P, NW (PDM),  Abduction: Nil loss; NE, IR: Moderate loss; P, NW (ERP)                                                                           (R) Shoulder resisted motion: ER, abduction; P, NW (pain/ache)       Assessment  Johnnie continue to respond to (R) Ankle DF mobs in partial weight bearing on chair and progressing with (R) ankle eccentric strengthening without production of symptoms.  He is also presenting witha (R) Shoulder derangement with a directional preference toward IR with self OP.  She was able to resist abduction and ER motions without pain and minimal ache at endrange of (R) shoulder flexion after this directional preference exercise.  Issued copy of his new HEP.  He understood and agreed withe the plan of care.  All questions were answered and addressed at this time.    Goals (to be met in 8 visits)   1. Patient to consistently  perform their HEP and it's progression to maintain their improved condition.  2. Patient to have reduced and abolished symptoms to to be able to walk, climb up/down stairs, wake up in the morning, exercise his legs without producing or increasing symptoms in the (R) dorsum of foot.   3. Patient to have WNL of (R) Ankle AROM in all directions with 4-5/5 MMT in all motions to promote all home, work, recreational, and functional activities without discomfort or deviation.       Plan  Re-assess next session and continue with directional preference exercise for the (R) Ankle and back, strengthening (eccentric/concentric), posture correction, postural strengthening, patient education, and HEP progression.    Treatment Last 4 Visits  Treatment Day: 3       3/25/2025 3/28/2025   LE Treatment   Therapeutic Exercise TM: Retro walk @ 5% grade x 1.0 mph x 10 mins; NE    LIT over rail x 10 reps; NE    Seated: (R) Ankle DF mobs with towel OP x 10 reps; NE     + eccentric strengthening with blackTB 10 reps x 10 eccen ct ea; NE     Step standing leaning on wall with (R) foot/ankle on 4 inch step x 10 reps; NE    Monster walk fwd/bkwd with greenTB abd resist 3 laps x 30 feet; NE tired    Hydrant (R/L) LE with 1 kg ball 8 reps x 10 cts ea; NE tired    (B) UE n.row greenTB x 10 reps x 10 cts ea; NE tired    (B) UE extension, w.row greenTB 5 reps x 10 cts ea; NE tired    LIT over rail x 10 reps; NE TM: Retro walk @ 15% grade x 1.2 mph x 10 mins; NE    LIT over rail x 10 reps; NE    Seated: (R) Ankle DF mobs with towel OP x 10 reps; NE    Standing: (R) Ankle DF mobs on chair x 10 reps; NE    Seated: (R) Ankle eccentric DF with blackTB x 10 reps x 10 eccen ct ea; NE     Step standing leaning on wall x 10 reps; NE    Monster walk fwd/bkwd  with redTB 3 laps x 30 feet; NE tired    (R) LE SLStance with 2-1 lb medial reach tap onto a foam roll 2 x 10 reps; NE tired    Standing: (R) Ankle DF mobs on chair (partial weight bearing) x 10  reps; NE   Therapeutic Exercise Minutes 41 46   Total Time Of Timed Procedures 41 46   Total Time Of Service-Based Procedures 0 0   Total Treatment Time 42 47   HEP LIT over rail x 10 reps x 1-2x/day  Seated: (R) Ankle DF mobs with towel OP x 10 reps x 1-2x/day  + eccentric strengthening with blackTB 10 reps x 10 eccen ct ea x 1-2x/day  LIT over rail x 10 reps x 1-2x/day  Standing: (R) Ankle DF mobs on chair x 10 reps x 1-2x/day  Seated: (R) Ankle eccentric DF with blackTB x 10 reps x 10 eccen ct ea x 1-2x/day             HEP  LIT over rail x 10 reps x 1-2x/day  Standing: (R) Ankle DF mobs on chair x 10 reps x 1-2x/day  Seated: (R) Ankle eccentric DF with blackTB x 10 reps x 10 eccen ct ea x 1-2x/day         Charges     TherEx x 3                           Plan  Re-assess next session and continue with directional preference exercise for the (R) Ankle and back, strengthening (eccentric/concentric), posture correction, postural strengthening, patient education, and HEP progression.    Treatment Last 4 Visits  Treatment Day: 4       3/25/2025 3/28/2025 4/1/2025   LE Treatment   Therapeutic Exercise TM: Retro walk @ 5% grade x 1.0 mph x 10 mins; NE    LIT over rail x 10 reps; NE    Seated: (R) Ankle DF mobs with towel OP x 10 reps; NE     + eccentric strengthening with blackTB 10 reps x 10 eccen ct ea; NE     Step standing leaning on wall with (R) foot/ankle on 4 inch step x 10 reps; NE    Monster walk fwd/bkwd with greenTB abd resist 3 laps x 30 feet; NE tired    Hydrant (R/L) LE with 1 kg ball 8 reps x 10 cts ea; NE tired    (B) UE n.row greenTB x 10 reps x 10 cts ea; NE tired    (B) UE extension, w.row greenTB 5 reps x 10 cts ea; NE tired    LIT over rail x 10 reps; NE TM: Retro walk @ 15% grade x 1.2 mph x 10 mins; NE    LIT over rail x 10 reps; NE    Seated: (R) Ankle DF mobs with towel OP x 10 reps; NE    Standing: (R) Ankle DF mobs on chair x 10 reps; NE    Seated: (R) Ankle eccentric DF with blackTB x 10  reps x 10 eccen ct ea; NE     Step standing leaning on wall x 10 reps; NE    Monster walk fwd/bkwd  with redTB 3 laps x 30 feet; NE tired    (R) LE SLStance with 2-1 lb medial reach tap onto a foam roll 2 x 10 reps; NE tired    Standing: (R) Ankle DF mobs on chair (partial weight bearing) x 10 reps; NE TM: Retro walk @ 15% grade x 1.1 mph x 10 mins; NE    Standing (R) Ankle DF mobs on chair x 10 mobs ea; NE     Seated: (R) Ankle eccentric strengthening DF with blackTB 20 reps x 10 eccen ct ea; NE    Step standing: (R) calf raises on 4 inch step x 10 reps; NE    (R) Shoulder repeated IR 2 x 10 reps; P, NW (better)     + self OP with opposite arm x 10 reps; NE    - Re-test (R) Shoulder no ache/pain with resisted ER and abduction; (R) Shoulder Flexion ROM only ERP and less    + towel OP x 10 reps; P, NW    - re-test (R) Shoulder: almost no pain/ache endrange and easier to lift up; no resistance pain/ache with abduction and ER      Therapeutic Exercise Minutes 41 46 47   Total Time Of Timed Procedures 41 46 47   Total Time Of Service-Based Procedures 0 0 0   Total Treatment Time 42 47 48   HEP LIT over rail x 10 reps x 1-2x/day  Seated: (R) Ankle DF mobs with towel OP x 10 reps x 1-2x/day  + eccentric strengthening with blackTB 10 reps x 10 eccen ct ea x 1-2x/day  LIT over rail x 10 reps x 1-2x/day  Standing: (R) Ankle DF mobs on chair x 10 reps x 1-2x/day  Seated: (R) Ankle eccentric DF with blackTB x 10 reps x 10 eccen ct ea x 1-2x/day      (R) Shoulder repeated IR with towel OP 10 reps x 3-4x/day        HEP  (R) Shoulder repeated IR with towel OP 10 reps x 3-4x/day    Charges     TherEx x 3

## 2025-04-08 ENCOUNTER — OFFICE VISIT (OUTPATIENT)
Dept: PHYSICAL THERAPY | Age: 70
End: 2025-04-08
Attending: STUDENT IN AN ORGANIZED HEALTH CARE EDUCATION/TRAINING PROGRAM
Payer: MEDICARE

## 2025-04-08 DIAGNOSIS — F41.9 ANXIETY: ICD-10-CM

## 2025-04-08 DIAGNOSIS — B02.9 HERPES ZOSTER WITHOUT COMPLICATION: ICD-10-CM

## 2025-04-08 PROCEDURE — 97110 THERAPEUTIC EXERCISES: CPT | Performed by: PHYSICAL THERAPIST

## 2025-04-08 RX ORDER — ALPRAZOLAM 0.25 MG
0.25 TABLET ORAL EVERY 6 HOURS PRN
Qty: 40 TABLET | Refills: 1 | Status: CANCELLED | OUTPATIENT
Start: 2025-04-08

## 2025-04-08 RX ORDER — GABAPENTIN 300 MG/1
CAPSULE ORAL NIGHTLY
Qty: 40 CAPSULE | Refills: 0 | OUTPATIENT
Start: 2025-04-08

## 2025-04-08 NOTE — TELEPHONE ENCOUNTER
Marked as discontinued.     Current refill request refused due to refill is either a duplicate request or has active refills at the pharmacy.  Check previous templates.    Requested Prescriptions     Refused Prescriptions Disp Refills    GABAPENTIN 300 MG Oral Cap [Pharmacy Med Name: Gabapentin 300 Mg Cap Nort] 40 capsule 0     Sig: Take 1-2 capsules (300-600 mg total) by mouth nightly.     Refused By: FAN WALLACE     Reason for Refusal: Inappropriate, get more info

## 2025-04-09 NOTE — TELEPHONE ENCOUNTER
1 refill available on 2/25 order.  MyChart sent to pt.      Current refill request refused due to refill is either a duplicate request or has active refills at the pharmacy.  Check previous templates.    Requested Prescriptions     Pending Prescriptions Disp Refills    ALPRAZolam 0.25 MG Oral Tab 40 tablet 1     Sig: Take 1 tablet (0.25 mg total) by mouth every 6 (six) hours as needed for Anxiety.

## 2025-04-15 ENCOUNTER — OFFICE VISIT (OUTPATIENT)
Dept: PHYSICAL THERAPY | Age: 70
End: 2025-04-15
Attending: STUDENT IN AN ORGANIZED HEALTH CARE EDUCATION/TRAINING PROGRAM
Payer: MEDICARE

## 2025-04-15 DIAGNOSIS — F41.9 ANXIETY: ICD-10-CM

## 2025-04-15 PROCEDURE — 97110 THERAPEUTIC EXERCISES: CPT | Performed by: PHYSICAL THERAPIST

## 2025-04-15 RX ORDER — ALPRAZOLAM 0.25 MG
0.25 TABLET ORAL EVERY 6 HOURS PRN
Qty: 40 TABLET | Refills: 1 | Status: CANCELLED | OUTPATIENT
Start: 2025-04-15

## 2025-04-15 NOTE — PROGRESS NOTES
Patient: Johnnie Madden (69 year old, male) Referring Provider:  Insurance:   Diagnosis: Mechanical low back pain (M54.59)  Lumbar foraminal stenosis (M48.061)  Lumbar spondylosis (M47.816)  Trigger point of neck (M54.2)  Chronic right shoulder pain (M25.511,G89.29)  Tendinopathy of rotator cuff, unspecified laterality (M67.919) (New Rx 4/1/2025) No ref. provider found  [employer] [place] INDEMNITY   Date of Surgery: NA Next MD visit:  MEDICARE   Precautions:  None NA Referral Information:    Date of Evaluation: Req: 0, Auth: 0, Exp:     02/18/25 POC Auth Visits:  8       Today's Date   4/15/2025    Subjective  Johnnie report that his (R) Ankle/Foot feels good at this time.  He has been feeling a numbness in the (R) back side of the knee when he carries his briefcase (20-25 lbs).  He has been doing the back exercise ((R) flexion-rotate) which he feels help his back but he still the numbness.  His (R) Shoulder is better but he does not have a bed/mat/counter that is high enough to use to apply OP for his HEP (repeated IR).  He uses a belt instead and pull as hard has he can.       Pain: 0/10     Objective  (R) Ankle AROM: WNL in all directions with 4/5 MMT in all motions.                                                              Lumbar AROM: Nil loss in all directions.                      (R) Shoulder: AROM: Flexion: Nil loss; P, NW (ERP),  Abduction: Nil loss; P, NW (ERP), IR: Moderate loss; P, NW (ERP);  resisted (R) shoulder abduction and ER; P, NW       Assessment  Johnnie was modified to do (L) SGIS to relieve symptoms in the (R) posterior knee.  He continue to respond (R) Shoulder repeated IR with OP.  He is slowly progressing postural strengthening exercises.  All questions were answered at this time.    Goals (to be met in 8 visits)   1. Patient to consistently perform their HEP and it's progression to maintain their improved condition.  2. Patient to have reduced and abolished symptoms to to be able to walk, climb up/down  stairs, wake up in the morning, exercise his legs without producing or increasing symptoms in the (R) dorsum of foot.   3. Patient to have WNL of (R) Ankle AROM in all directions with 4-5/5 MMT in all motions to promote all home, work, recreational, and functional activities without discomfort or deviation.       Plan  Re-assess next session and continue with directional preference exercise for the (R) Ankle and back, strengthening (eccentric/concentric), posture correction, postural strengthening, patient education, and HEP progression.    Treatment Last 4 Visits  Treatment Day: 3       3/25/2025 3/28/2025   LE Treatment   Therapeutic Exercise TM: Retro walk @ 5% grade x 1.0 mph x 10 mins; NE    LIT over rail x 10 reps; NE    Seated: (R) Ankle DF mobs with towel OP x 10 reps; NE     + eccentric strengthening with blackTB 10 reps x 10 eccen ct ea; NE     Step standing leaning on wall with (R) foot/ankle on 4 inch step x 10 reps; NE    Monster walk fwd/bkwd with greenTB abd resist 3 laps x 30 feet; NE tired    Hydrant (R/L) LE with 1 kg ball 8 reps x 10 cts ea; NE tired    (B) UE n.row greenTB x 10 reps x 10 cts ea; NE tired    (B) UE extension, w.row greenTB 5 reps x 10 cts ea; NE tired    LIT over rail x 10 reps; NE TM: Retro walk @ 15% grade x 1.2 mph x 10 mins; NE    LIT over rail x 10 reps; NE    Seated: (R) Ankle DF mobs with towel OP x 10 reps; NE    Standing: (R) Ankle DF mobs on chair x 10 reps; NE    Seated: (R) Ankle eccentric DF with blackTB x 10 reps x 10 eccen ct ea; NE     Step standing leaning on wall x 10 reps; NE    Monster walk fwd/bkwd  with redTB 3 laps x 30 feet; NE tired    (R) LE SLStance with 2-1 lb medial reach tap onto a foam roll 2 x 10 reps; NE tired    Standing: (R) Ankle DF mobs on chair (partial weight bearing) x 10 reps; NE   Therapeutic Exercise Minutes 41 46   Total Time Of Timed Procedures 41 46   Total Time Of Service-Based Procedures 0 0   Total Treatment Time 42 47   HEP LIT  over rail x 10 reps x 1-2x/day  Seated: (R) Ankle DF mobs with towel OP x 10 reps x 1-2x/day  + eccentric strengthening with blackTB 10 reps x 10 eccen ct ea x 1-2x/day  LIT over rail x 10 reps x 1-2x/day  Standing: (R) Ankle DF mobs on chair x 10 reps x 1-2x/day  Seated: (R) Ankle eccentric DF with blackTB x 10 reps x 10 eccen ct ea x 1-2x/day             HEP  LIT over rail x 10 reps x 1-2x/day  Standing: (R) Ankle DF mobs on chair x 10 reps x 1-2x/day  Seated: (R) Ankle eccentric DF with blackTB x 10 reps x 10 eccen ct ea x 1-2x/day         Charges     TherEx x 3                               Plan  Re-assess next session and continue with directional preference exercise for the (R) Ankle and back, strengthening (eccentric/concentric), posture correction, postural strengthening, patient education, and HEP progression.    Treatment Last 4 Visits  Treatment Day: 6       4/15/2025   Spine Treatment   Therapeutic Exercise TM: Retro walk @ 15% grade x 1.2 mph x 10 mins; NE postural training    (R) SGIS x 10 reps; NE    (R) Shoulder repeated IR with mat/table OP  2 x 10 reps; P, NW     (R) Shoulder IR eccentric load with blackTB 2 x 10 reps x 10 eccen ct ea; NE tired    - minimal to no pain/ache (R) shoulder with resisted ER and abduction,  and endrange flexion, abduction, ER    (B) UE OH wall walk with greenTB abduction resist 10 reps x 10 cts ea; NE tired    (R/L) UE D1D2 flexion/extension blueTB x 20 reps; NE     (R) Shoulder IR with mat/table x 10 reps; P, NW     (R) SGIS x 10 reps; NE       Therapeutic Exercise Minutes 54   Total Time Of Timed Procedures 54   Total Time Of Service-Based Procedures 0   Total Treatment Time 56   HEP (R) Shoulder IR with mat/table x 10 reps x 1-2x/day    (R) Shoulder IR eccentric load with blackTB x 10 reps x 10 eccen x 1-2x/day    (R) SGIS x 10 reps x 1-2x/day          HEP  (R) Shoulder IR with mat/table x 10 reps x 1-2x/day    (R) Shoulder IR eccentric load with blackTB x 10 reps x  10 eccen x 1-2x/day    (R) SGIS x 10 reps x 1-2x/day      Charges  TherEx x 4

## 2025-04-18 ENCOUNTER — OFFICE VISIT (OUTPATIENT)
Dept: PHYSICAL THERAPY | Age: 70
End: 2025-04-18
Attending: STUDENT IN AN ORGANIZED HEALTH CARE EDUCATION/TRAINING PROGRAM
Payer: MEDICARE

## 2025-04-18 PROCEDURE — 97110 THERAPEUTIC EXERCISES: CPT | Performed by: PHYSICAL THERAPIST

## 2025-04-18 NOTE — PROGRESS NOTES
Patient: Johnnie Madden (69 year old, male) Referring Provider:  Insurance:   Diagnosis: Mechanical low back pain (M54.59)  Lumbar foraminal stenosis (M48.061)  Lumbar spondylosis (M47.816)  Trigger point of neck (M54.2)  Chronic right shoulder pain (M25.511,G89.29)  Tendinopathy of rotator cuff, unspecified laterality (M67.919) (New Rx 4/1/2025) No ref. provider found  [employer] [place] INDEMN[place]   Date of Surgery: NA Next MD visit:  MEDICARE   Precautions:  None NA Referral Information:    Date of Evaluation: Req: 0, Auth: 0, Exp:     02/18/25 POC Auth Visits:  8       Today's Date   4/18/2025    Subjective  Johnnie state that his (R) foot is feeling good.  There is no pain/ache and he is able to ambulate without any discomfort.  He is feeling some numbness and discomfort in the (R) leg/posterior thigh/back of knee when he carries anything in the (L) hand/arm.  He has been doing his back exercise ((R) SGIS) but has not improved.       Pain: 0/10     Objective  (R) Ankle WNL of AROM with 4/5 MMT.       Assessment  Johnnie was changed to do lumbar extension with a (R) lateral shift (with (R) RK) in the unloaed position to reduce and abolish the (R) posterior knee discomfort/numbness. The (R) Foot/Ankle remained symtpomfree during his session.  Progressed (B) LE strengthening/stability exercises with minimal cueing to correct form and alignment.  All questions were answered at this time.    Goals (to be met in 8 visits)   1. Patient to consistently perform their HEP and it's progression to maintain their improved condition.  2. Patient to have reduced and abolished symptoms to to be able to walk, climb up/down stairs, wake up in the morning, exercise his legs without producing or increasing symptoms in the (R) dorsum of foot.   3. Patient to have WNL of (R) Ankle AROM in all directions with 4-5/5 MMT in all motions to promote all home, work, recreational, and functional activities without discomfort or deviation.        Plan  Re-assess next session and continue with directional preference exercise for the (R) Ankle and back, strengthening (eccentric/concentric), posture correction, postural strengthening, patient education, and HEP progression.    Treatment Last 4 Visits  Treatment Day: 3       3/25/2025 3/28/2025   LE Treatment   Therapeutic Exercise TM: Retro walk @ 5% grade x 1.0 mph x 10 mins; NE    LIT over rail x 10 reps; NE    Seated: (R) Ankle DF mobs with towel OP x 10 reps; NE     + eccentric strengthening with blackTB 10 reps x 10 eccen ct ea; NE     Step standing leaning on wall with (R) foot/ankle on 4 inch step x 10 reps; NE    Monster walk fwd/bkwd with greenTB abd resist 3 laps x 30 feet; NE tired    Hydrant (R/L) LE with 1 kg ball 8 reps x 10 cts ea; NE tired    (B) UE n.row greenTB x 10 reps x 10 cts ea; NE tired    (B) UE extension, w.row greenTB 5 reps x 10 cts ea; NE tired    LIT over rail x 10 reps; NE TM: Retro walk @ 15% grade x 1.2 mph x 10 mins; NE    LIT over rail x 10 reps; NE    Seated: (R) Ankle DF mobs with towel OP x 10 reps; NE    Standing: (R) Ankle DF mobs on chair x 10 reps; NE    Seated: (R) Ankle eccentric DF with blackTB x 10 reps x 10 eccen ct ea; NE     Step standing leaning on wall x 10 reps; NE    Monster walk fwd/bkwd  with redTB 3 laps x 30 feet; NE tired    (R) LE SLStance with 2-1 lb medial reach tap onto a foam roll 2 x 10 reps; NE tired    Standing: (R) Ankle DF mobs on chair (partial weight bearing) x 10 reps; NE   Therapeutic Exercise Minutes 41 46   Total Time Of Timed Procedures 41 46   Total Time Of Service-Based Procedures 0 0   Total Treatment Time 42 47   HEP LIT over rail x 10 reps x 1-2x/day  Seated: (R) Ankle DF mobs with towel OP x 10 reps x 1-2x/day  + eccentric strengthening with blackTB 10 reps x 10 eccen ct ea x 1-2x/day  LIT over rail x 10 reps x 1-2x/day  Standing: (R) Ankle DF mobs on chair x 10 reps x 1-2x/day  Seated: (R) Ankle eccentric DF with  blackTB x 10 reps x 10 eccen ct ea x 1-2x/day             HEP  LIT over rail x 10 reps x 1-2x/day  Standing: (R) Ankle DF mobs on chair x 10 reps x 1-2x/day  Seated: (R) Ankle eccentric DF with blackTB x 10 reps x 10 eccen ct ea x 1-2x/day         Charges     TherEx x 3                                   Plan  Re-assess next session and continue with directional preference exercise for the (R) Ankle and back, strengthening (eccentric/concentric), posture correction, postural strengthening, patient education, and HEP progression.    Treatment Last 4 Visits  Treatment Day: 7       3/28/2025 4/1/2025 4/8/2025 4/18/2025   LE Treatment   Therapeutic Exercise TM: Retro walk @ 15% grade x 1.2 mph x 10 mins; NE    LIT over rail x 10 reps; NE    Seated: (R) Ankle DF mobs with towel OP x 10 reps; NE    Standing: (R) Ankle DF mobs on chair x 10 reps; NE    Seated: (R) Ankle eccentric DF with blackTB x 10 reps x 10 eccen ct ea; NE     Step standing leaning on wall x 10 reps; NE    Monster walk fwd/bkwd  with redTB 3 laps x 30 feet; NE tired    (R) LE SLStance with 2-1 lb medial reach tap onto a foam roll 2 x 10 reps; NE tired    Standing: (R) Ankle DF mobs on chair (partial weight bearing) x 10 reps; NE TM: Retro walk @ 15% grade x 1.1 mph x 10 mins; NE    Standing (R) Ankle DF mobs on chair x 10 mobs ea; NE     Seated: (R) Ankle eccentric strengthening DF with blackTB 20 reps x 10 eccen ct ea; NE    Step standing: (R) calf raises on 4 inch step x 10 reps; NE    (R) Shoulder repeated IR 2 x 10 reps; P, NW (better)     + self OP with opposite arm x 10 reps; NE    - Re-test (R) Shoulder no ache/pain with resisted ER and abduction; (R) Shoulder Flexion ROM only ERP and less    + towel OP x 10 reps; P, NW    - re-test (R) Shoulder: almost no pain/ache endrange and easier to lift up; no resistance pain/ache with abduction and ER    TM: Retro walk @ 15% grade x 1.2 mph x 10 mins; NE postural training    Supine: (R) Flex-rotate with  self OP x 10 reps x 10 cts ea; NE (good stretch)    (R) Shoulder repeated IR with belt OP x 10 reps; P, NW (R) shoulder ache/stretch    (R) Shoulder repeated IR with mat/table OP x 10 reps; P, NW (R) shoulder ache/stretch    - (R) Shoulder re-test: Flexion Nil loss; NE, IR: Minimal loss; P, NW    (R) Shoulder IR eccentric strengthening blackTB 2 x 10 reps x 10 eccen ct ea; NE tired    Seated: (R) Ankle DF with towel OP x 10 reps; NE stretch     + eccentric strengthening with blackTB 2 x 10 reps x 10 eccen ct ea; NE     Repeated (R) Shoulder IR with mat/table OP x 10 reps; P, NW TM: Retro walk @ 15% grade x 1.2 mph x 10 mins; NE postural training    (L) SGIS x 10 reps; P, NW (R) posterior knee (discomfort/numbness)    (L) SGIS extension x 5 reps; P, NW same    LIT x 10 reps; NE    LIT over rail 2 x 10 reps; P, NW same    Pronelying x 1 min; NE    REIL x 10 reps; P, NW same    Pronelying with (R) lateral shift; NE    + REIL x 5 reps; P, NW same    - increased (R) lateral shift with (R) RK (low) x 10 reps; NE    Shuttle: (R/L) leg press 6b x 20 reps ea; NE    Shuttle: (B) calf raises 4b x 20 reps; NE tired    Step up/straddle step onto a 6 inch step with a blueTB abduction resist (R/L) LE lead x 20 reps ea; NE tired    (R/L) LE lateral eccentric step down hang from a 6 inch step x 10 reps x 5 eccen ct ea; NE tired    REIL with (R) lateral shift with (R) RK x 10 reps; NE   Therapeutic Exercise Minutes 46 47 54 49   Total Time Of Timed Procedures 46 47 54 49   Total Time Of Service-Based Procedures 0 0 0 0   Total Treatment Time 47 48 56 51   HEP LIT over rail x 10 reps x 1-2x/day  Standing: (R) Ankle DF mobs on chair x 10 reps x 1-2x/day  Seated: (R) Ankle eccentric DF with blackTB x 10 reps x 10 eccen ct ea x 1-2x/day      (R) Shoulder repeated IR with towel OP 10 reps x 3-4x/day - Seated: (R) Ankle DF with towel OP x 10 reps x 1-2x/day  - (R) Ankle DF eccentric strengthening blackTB 2 x 10 reps x 10 eccen ct ea x  1-2x/day  - (R) Shoulder repeated IR with mat/table OP x 10 reps x 1-2x/day  - (R) Shoulder eccentric IR strengthening with blackTB 2 x 10 reps x 10 eccen ct ea x 1-2x/day REIL with (R) lateral shift with (R) RK x 10 reps x 2-3x/day            HEP  REIL with (R) lateral shift with (R) RK x 10 reps x 2-3x/day        Charges     TherEx x 3

## 2025-04-25 ENCOUNTER — APPOINTMENT (OUTPATIENT)
Dept: PHYSICAL THERAPY | Age: 70
End: 2025-04-25
Attending: STUDENT IN AN ORGANIZED HEALTH CARE EDUCATION/TRAINING PROGRAM
Payer: MEDICARE

## 2025-04-29 ENCOUNTER — OFFICE VISIT (OUTPATIENT)
Dept: PHYSICAL THERAPY | Age: 70
End: 2025-04-29
Attending: STUDENT IN AN ORGANIZED HEALTH CARE EDUCATION/TRAINING PROGRAM
Payer: MEDICARE

## 2025-04-29 PROCEDURE — 97110 THERAPEUTIC EXERCISES: CPT | Performed by: PHYSICAL THERAPIST

## 2025-04-29 NOTE — PROGRESS NOTES
Patient: Johnnie Madden (69 year old, male) Referring Provider:  Insurance:   Diagnosis: Mechanical low back pain (M54.59)  Lumbar foraminal stenosis (M48.061)  Lumbar spondylosis (M47.816)  Trigger point of neck (M54.2)  Chronic right shoulder pain (M25.511,G89.29)  Tendinopathy of rotator cuff, unspecified laterality (M67.919) (New Rx 4/1/2025) Behar, A. MD [facility] [place] INDEMNITY   Date of Surgery: NA Next MD visit:  MEDICARE   Precautions:  None NA Referral Information:    Date of Evaluation: Req: 0, Auth: 0, Exp:     02/18/25 POC Auth Visits:  8       Today's Date   4/29/2025    Subjective  Johnnie report that he does not have any pain/ache in the back and (R) shoulder at this time. He would still feel an ache/discomfort in the (R) buttock when he carries anythingon the (L) side and at times when he would stand up and go to the restroom at work.  It only lasts a few seconds but he feels it.  He has been doing his back HEP (REIL with (R) Lateral shift with (R) RK) regularly since last session.       Pain: 0/10     Objective  Pre-test symptoms: 0/10 (R) lower back and (R) Shoulder:  Lumbar mobility:  Flexion; Nil loss; NE,  Extension: Minimal loss; NE,  (R/L) SG: Nil loss; NE stretch endrange              (R) Shoulder: Flexion: Nil loss; NE,  Abduction: Nil loss; P, NW (ache), (R) Shoulder resisted ER: P, NW (R) shoulder       Assessment  Johnnie has attended 8 physical therapy sessions from 4/1/2025 to 4/29/2025.  He now report intermittent (R) buttock, posterior thigh ache/tightness/numbness rated 0-2/10 and (R) endrange ache toward abduction and upon resisted ER only rated 0-3/10.  He has WNL of lumbar AROM and (R) Shoulder AROM.  He has been responding to a directional preference exercise to the back and (R) Shoulder and remain well motivated.  He is also compliant with his HEP and it's progression.  He will benefit to continue with added PT to completely abolish symptoms in the (R) shoulder and (R) lower back and attain  all goals set during initial evaluation.    Goals (to be met in 8 visits)   1. Patient to consistently perform their HEP and it's progression to maintain their improved condition.  2. Patient to have reduced and abolished symptoms to to be able to walk, climb up/down stairs, wake up in the morning, exercise his legs without producing or increasing symptoms in the (R) dorsum of foot.   3. Patient to have WNL of (R) Ankle AROM in all directions with 4-5/5 MMT in all motions to promote all home, work, recreational, and functional activities without discomfort or deviation.       Plan  Re-assess next session and continue with directional preference exercise for the (R) Ankle and back, strengthening (eccentric/concentric), posture correction, postural strengthening, patient education, and HEP progression.    Treatment Last 4 Visits  Treatment Day: 3       3/25/2025 3/28/2025   LE Treatment   Therapeutic Exercise TM: Retro walk @ 5% grade x 1.0 mph x 10 mins; NE    LIT over rail x 10 reps; NE    Seated: (R) Ankle DF mobs with towel OP x 10 reps; NE     + eccentric strengthening with blackTB 10 reps x 10 eccen ct ea; NE     Step standing leaning on wall with (R) foot/ankle on 4 inch step x 10 reps; NE    Monster walk fwd/bkwd with greenTB abd resist 3 laps x 30 feet; NE tired    Hydrant (R/L) LE with 1 kg ball 8 reps x 10 cts ea; NE tired    (B) UE n.row greenTB x 10 reps x 10 cts ea; NE tired    (B) UE extension, w.row greenTB 5 reps x 10 cts ea; NE tired    LIT over rail x 10 reps; NE TM: Retro walk @ 15% grade x 1.2 mph x 10 mins; NE    LIT over rail x 10 reps; NE    Seated: (R) Ankle DF mobs with towel OP x 10 reps; NE    Standing: (R) Ankle DF mobs on chair x 10 reps; NE    Seated: (R) Ankle eccentric DF with blackTB x 10 reps x 10 eccen ct ea; NE     Step standing leaning on wall x 10 reps; NE    Monster walk fwd/bkwd  with redTB 3 laps x 30 feet; NE tired    (R) LE SLStance with 2-1 lb medial reach tap onto a  foam roll 2 x 10 reps; NE tired    Standing: (R) Ankle DF mobs on chair (partial weight bearing) x 10 reps; NE   Therapeutic Exercise Minutes 41 46   Total Time Of Timed Procedures 41 46   Total Time Of Service-Based Procedures 0 0   Total Treatment Time 42 47   HEP LIT over rail x 10 reps x 1-2x/day  Seated: (R) Ankle DF mobs with towel OP x 10 reps x 1-2x/day  + eccentric strengthening with blackTB 10 reps x 10 eccen ct ea x 1-2x/day  LIT over rail x 10 reps x 1-2x/day  Standing: (R) Ankle DF mobs on chair x 10 reps x 1-2x/day  Seated: (R) Ankle eccentric DF with blackTB x 10 reps x 10 eccen ct ea x 1-2x/day             HEP  LIT over rail x 10 reps x 1-2x/day  Standing: (R) Ankle DF mobs on chair x 10 reps x 1-2x/day  Seated: (R) Ankle eccentric DF with blackTB x 10 reps x 10 eccen ct ea x 1-2x/day         Charges     TherEx x 3                                       Plan  Continue with 4 more additional PT sessions to progress directional preference exercise for the (R) Ankle and back, strengthening (eccentric/concentric), posture correction, postural strengthening, patient education, and HEP progression.    Treatment Last 4 Visits  Treatment Day: 8       4/15/2025 4/29/2025   Spine Treatment   Therapeutic Exercise TM: Retro walk @ 15% grade x 1.2 mph x 10 mins; NE postural training    (R) SGIS x 10 reps; NE    (R) Shoulder repeated IR with mat/table OP  2 x 10 reps; P, NW     (R) Shoulder IR eccentric load with blackTB 2 x 10 reps x 10 eccen ct ea; NE tired    - minimal to no pain/ache (R) shoulder with resisted ER and abduction,  and endrange flexion, abduction, ER    (B) UE OH wall walk with greenTB abduction resist 10 reps x 10 cts ea; NE tired    (R/L) UE D1D2 flexion/extension blueTB x 20 reps; NE     (R) Shoulder IR with mat/table x 10 reps; P, NW     (R) SGIS x 10 reps; NE     TM: Retro walk @ 10% grade x 1.0 mph x 10 mins; NE postural training    Supine: Lumbar (R) flexion rotation with self OP 10 reps  x 5 cts ea; P, NW (R) buttock and (R) posterior thigh (better)    (R) Shoulder repeated IR with mat/table OP x 10 reps; P, NW stretch/ache    (R) Shoulder eccentric load with blackTB 2 x 10 reps x 10 eccen ct ea; NE tired    (B) UE rhytmic-stab extension greenTB 10 sets x 10 bounces ea; NE tired    Step up forward/straddle step onto a 6 inch step with a greenTB abduction resist (R/L) LE lead x 20 reps ea; NE tired    (B) UE OH wall walk with redTB abduction resist 10 reps x 10 cts ea; NE tired    Supine: Lumbar flex-rotate (R) with self OP 10 reps x 5 cts ea; NE    Therapeutic Exercise Minutes 54 54   Total Time Of Timed Procedures 54 54   Total Time Of Service-Based Procedures 0 0   Total Treatment Time 56 55   HEP (R) Shoulder IR with mat/table x 10 reps x 1-2x/day    (R) Shoulder IR eccentric load with blackTB x 10 reps x 10 eccen x 1-2x/day    (R) SGIS x 10 reps x 1-2x/day   Supine: Lumbar flex-rotate (R) with self OP 10 reps x 5 cts ea x 2-3x/day    (R) Shoulder repeated IR with mat/table OP x 10 reps x 1-2x/day    (R) Shoulder eccentric load with blackTB 2 x 10 reps x 10 eccen ct ea x 1-2x/day         HEP  Supine: Lumbar flex-rotate (R) with self OP 10 reps x 5 cts ea x 2-3x/day    (R) Shoulder repeated IR with mat/table OP x 10 reps x 1-2x/day    (R) Shoulder eccentric load with blackTB 2 x 10 reps x 10 eccen ct ea x 1-2x/day     Charges  TherEx x 4

## 2025-04-29 NOTE — PROGRESS NOTES
Patient: Johnnie Madden (69 year old, male) Referring Provider:  Insurance:   Diagnosis: Mechanical low back pain (M54.59)  Lumbar foraminal stenosis (M48.061)  Lumbar spondylosis (M47.816)  Trigger point of neck (M54.2)  Chronic right shoulder pain (M25.511,G89.29)  Tendinopathy of rotator cuff, unspecified laterality (M67.919) (New Rx 4/1/2025) Behar, A. MD [facility] [place] INDEMNITY   Date of Surgery: NA Next MD visit:  MEDICARE   Precautions:  None NA Referral Information:    Date of Evaluation: Req: 0, Auth: 0, Exp:     02/18/25 POC Auth Visits:  8       Today's Date   4/29/2025    Subjective  Johnnie report that he does not have any pain/ache in the back and (R) shoulder at this time. He would still feel an ache/discomfort in the (R) buttock when he carries anythingon the (L) side and at times when he would stand up and go to the restroom at work.  It only lasts a few seconds but he feels it.  He has been doing his back HEP (REIL with (R) Lateral shift with (R) RK) regularly since last session.       Pain: 0/10     Objective  Pre-test symptoms: 0/10 (R) lower back and (R) Shoulder:  Lumbar mobility:  Flexion; Nil loss; NE,  Extension: Minimal loss; NE,  (R/L) SG: Nil loss; NE stretch endrange              (R) Shoulder: Flexion: Nil loss; NE,  Abduction: Nil loss; P, NW (ache), (R) Shoulder resisted ER: P, NW (R) shoulder       Assessment  Johnnie has attended 8 physical therapy sessions from 4/1/2025 to 4/29/2025.  He now report intermittent (R) buttock, posterior thigh ache/tightness/numbness rated 0-2/10 and (R) endrange ache toward abduction and upon resisted ER only rated 0-3/10.  He has WNL of lumbar AROM and (R) Shoulder AROM.  He has been responding to a directional preference exercise to the back and (R) Shoulder and remain well motivated.  He is also compliant with his HEP and it's progression.  He will benefit to continue with added PT to completely abolish symptoms in the (R) shoulder and (R) lower back and attain  all goals set during initial evaluation.    Goals (to be met in 8 visits)   1. Patient to consistently perform their HEP and it's progression to maintain their improved condition.  2. Patient to have reduced and abolished symptoms to to be able to walk, climb up/down stairs, wake up in the morning, exercise his legs without producing or increasing symptoms in the (R) dorsum of foot.   3. Patient to have WNL of (R) Ankle AROM in all directions with 4-5/5 MMT in all motions to promote all home, work, recreational, and functional activities without discomfort or deviation.      Plan  Continue with 4 more additional PT sessions to progress directional preference exercise for the (R) Ankle and back, strengthening (eccentric/concentric), posture correction, postural strengthening, patient education, and HEP progression.

## 2025-05-06 ENCOUNTER — OFFICE VISIT (OUTPATIENT)
Dept: PHYSICAL THERAPY | Age: 70
End: 2025-05-06
Attending: STUDENT IN AN ORGANIZED HEALTH CARE EDUCATION/TRAINING PROGRAM
Payer: MEDICARE

## 2025-05-06 DIAGNOSIS — F41.9 ANXIETY: ICD-10-CM

## 2025-05-06 PROCEDURE — 97110 THERAPEUTIC EXERCISES: CPT | Performed by: PHYSICAL THERAPIST

## 2025-05-06 RX ORDER — ESCITALOPRAM OXALATE 10 MG/1
10 TABLET ORAL DAILY
Qty: 90 TABLET | Refills: 3 | Status: SHIPPED | OUTPATIENT
Start: 2025-05-06

## 2025-05-06 NOTE — PROGRESS NOTES
Patient: Johnnie Madden (69 year old, male) Referring Provider:  Insurance:   Diagnosis: Mechanical low back pain (M54.59)  Lumbar foraminal stenosis (M48.061)  Lumbar spondylosis (M47.816)  Trigger point of neck (M54.2)  Chronic right shoulder pain (M25.511,G89.29)  Tendinopathy of rotator cuff, unspecified laterality (M67.919) (New Rx 4/1/2025) No ref. provider found  [employer] [place] INDEMNITY   Date of Surgery: NA Next MD visit:  MEDICARE   Precautions:  None NA Referral Information:    Date of Evaluation: Req: 0, Auth: 0, Exp:     02/18/25 POC Auth Visits:  12       Today's Date   5/6/2025    Subjective  Johnnie report that he feels better in the back with (R) flex-rotate with knees higher up.  He does not have any pain ache in the back and for the first time he was able to sleep at night without symptoms in the (R) shoulder.  He has been doing his (R) shoulder repeated IR and eccentric strengthening using a cable column machine in the gym.       Pain: 0/10     Objective  Pre-test symptoms: 0/10 (R) lower back and (R) Shoulder:  Lumbar mobility:  Flexion; Nil loss; NE,  Extension: Nil loss; NE,  (R/L) SG: Nil loss; NE               (R) Shoulder: Flexion: Nil loss; NE,  Abduction: Nil loss; P, NW (ache), (R) Shoulder resisted ER: P, NW (R) shoulder        Assessment  Johnnie continue to respond to lumbar spine (R) flex-rotate and (R) shoulder IR directional preference exercises.  He had no symtpoms and has WNL of lumbar and (R) Shoulder AROM in all directions.  He is progressing with postural strenghthening and (R) shoulder strengthening/stability exercises with cueing to correct form and technique.  He was tired/fatigued after his session.  All questions were answered at this time.    Goals (to be met in 12 visits)   1. Patient to consistently perform their HEP and it's progression to maintain their improved condition.  2. Patient to have reduced and abolished symptoms to to be able to walk, climb up/down stairs, wake up in  the morning, exercise his legs without producing or increasing symptoms in the (R) dorsum of foot.   3. Patient to have WNL of (R) Ankle AROM in all directions with 4-5/5 MMT in all motions to promote all home, work, recreational, and functional activities without discomfort or deviation.       Plan  Re-assess next session and continue with directional preference exercise for the (R) Ankle and back, strengthening (eccentric/concentric), posture correction, postural strengthening, patient education, and HEP progression.    Treatment Last 4 Visits  Treatment Day: 3       3/25/2025 3/28/2025   LE Treatment   Therapeutic Exercise TM: Retro walk @ 5% grade x 1.0 mph x 10 mins; NE    LIT over rail x 10 reps; NE    Seated: (R) Ankle DF mobs with towel OP x 10 reps; NE     + eccentric strengthening with blackTB 10 reps x 10 eccen ct ea; NE     Step standing leaning on wall with (R) foot/ankle on 4 inch step x 10 reps; NE    Monster walk fwd/bkwd with greenTB abd resist 3 laps x 30 feet; NE tired    Hydrant (R/L) LE with 1 kg ball 8 reps x 10 cts ea; NE tired    (B) UE n.row greenTB x 10 reps x 10 cts ea; NE tired    (B) UE extension, w.row greenTB 5 reps x 10 cts ea; NE tired    LIT over rail x 10 reps; NE TM: Retro walk @ 15% grade x 1.2 mph x 10 mins; NE    LIT over rail x 10 reps; NE    Seated: (R) Ankle DF mobs with towel OP x 10 reps; NE    Standing: (R) Ankle DF mobs on chair x 10 reps; NE    Seated: (R) Ankle eccentric DF with blackTB x 10 reps x 10 eccen ct ea; NE     Step standing leaning on wall x 10 reps; NE    Monster walk fwd/bkwd  with redTB 3 laps x 30 feet; NE tired    (R) LE SLStance with 2-1 lb medial reach tap onto a foam roll 2 x 10 reps; NE tired    Standing: (R) Ankle DF mobs on chair (partial weight bearing) x 10 reps; NE   Therapeutic Exercise Minutes 41 46   Total Time Of Timed Procedures 41 46   Total Time Of Service-Based Procedures 0 0   Total Treatment Time 42 47   HEP LIT over rail x 10  reps x 1-2x/day  Seated: (R) Ankle DF mobs with towel OP x 10 reps x 1-2x/day  + eccentric strengthening with blackTB 10 reps x 10 eccen ct ea x 1-2x/day  LIT over rail x 10 reps x 1-2x/day  Standing: (R) Ankle DF mobs on chair x 10 reps x 1-2x/day  Seated: (R) Ankle eccentric DF with blackTB x 10 reps x 10 eccen ct ea x 1-2x/day             HEP  LIT over rail x 10 reps x 1-2x/day  Standing: (R) Ankle DF mobs on chair x 10 reps x 1-2x/day  Seated: (R) Ankle eccentric DF with blackTB x 10 reps x 10 eccen ct ea x 1-2x/day         Charges     TherEx x 3                                           Plan  Re-assess next sessi and continue to progress directional preference exercise for the (R) Ankle and back, strengthening (eccentric/concentric), posture correction, postural strengthening, patient education, and HEP progression.    Treatment Last 4 Visits  Treatment Day: 9 4/1/2025 4/8/2025 4/18/2025 5/6/2025   LE Treatment   Therapeutic Exercise TM: Retro walk @ 15% grade x 1.1 mph x 10 mins; NE    Standing (R) Ankle DF mobs on chair x 10 mobs ea; NE     Seated: (R) Ankle eccentric strengthening DF with blackTB 20 reps x 10 eccen ct ea; NE    Step standing: (R) calf raises on 4 inch step x 10 reps; NE    (R) Shoulder repeated IR 2 x 10 reps; P, NW (better)     + self OP with opposite arm x 10 reps; NE    - Re-test (R) Shoulder no ache/pain with resisted ER and abduction; (R) Shoulder Flexion ROM only ERP and less    + towel OP x 10 reps; P, NW    - re-test (R) Shoulder: almost no pain/ache endrange and easier to lift up; no resistance pain/ache with abduction and ER    TM: Retro walk @ 15% grade x 1.2 mph x 10 mins; NE postural training    Supine: (R) Flex-rotate with self OP x 10 reps x 10 cts ea; NE (good stretch)    (R) Shoulder repeated IR with belt OP x 10 reps; P, NW (R) shoulder ache/stretch    (R) Shoulder repeated IR with mat/table OP x 10 reps; P, NW (R) shoulder ache/stretch    - (R) Shoulder re-test:  Flexion Nil loss; NE, IR: Minimal loss; P, NW    (R) Shoulder IR eccentric strengthening blackTB 2 x 10 reps x 10 eccen ct ea; NE tired    Seated: (R) Ankle DF with towel OP x 10 reps; NE stretch     + eccentric strengthening with blackTB 2 x 10 reps x 10 eccen ct ea; NE     Repeated (R) Shoulder IR with mat/table OP x 10 reps; P, NW TM: Retro walk @ 15% grade x 1.2 mph x 10 mins; NE postural training    (L) SGIS x 10 reps; P, NW (R) posterior knee (discomfort/numbness)    (L) SGIS extension x 5 reps; P, NW same    LIT x 10 reps; NE    LIT over rail 2 x 10 reps; P, NW same    Pronelying x 1 min; NE    REIL x 10 reps; P, NW same    Pronelying with (R) lateral shift; NE    + REIL x 5 reps; P, NW same    - increased (R) lateral shift with (R) RK (low) x 10 reps; NE    Shuttle: (R/L) leg press 6b x 20 reps ea; NE    Shuttle: (B) calf raises 4b x 20 reps; NE tired    Step up/straddle step onto a 6 inch step with a blueTB abduction resist (R/L) LE lead x 20 reps ea; NE tired    (R/L) LE lateral eccentric step down hang from a 6 inch step x 10 reps x 5 eccen ct ea; NE tired    REIL with (R) lateral shift with (R) RK x 10 reps; NE TM: Retro walk @ 15% grade x 1.0 mph x 10 mins; NE postural training    Supine: (R) Flex-rotate with knees higher up and with self OP x 10 reps x 10 cts ea; NE tired    (R) Shoulder repeated IR with mat/table OP x 10 reps; NE good stretch    (R) Shoulder IR eccentric strengthening with blackTB and double blackTB 2 x 10 reps x 10 eccen ct ea; NE tired    (R/L) LE forward stance with 4 lb ball diagonal lift 2.5 sets x 10 reps ea; NE tired    Inverted bosu (B) LE squat 10 reps x 10 cts ea; NE tired    (B) UE OH wall walk with greenTB abduction resist 10 reps x 10 cts ea; NE tired    Lumbar spine (R) flex-rotate with knees higher up with self OP 5 reps x 10 cts   Therapeutic Exercise Minutes 47 54 49 49   Total Time Of Timed Procedures 47 54 49 49   Total Time Of Service-Based Procedures 0 0 0 0    Total Treatment Time 48 56 51 52   HEP (R) Shoulder repeated IR with towel OP 10 reps x 3-4x/day - Seated: (R) Ankle DF with towel OP x 10 reps x 1-2x/day  - (R) Ankle DF eccentric strengthening blackTB 2 x 10 reps x 10 eccen ct ea x 1-2x/day  - (R) Shoulder repeated IR with mat/table OP x 10 reps x 1-2x/day  - (R) Shoulder eccentric IR strengthening with blackTB 2 x 10 reps x 10 eccen ct ea x 1-2x/day REIL with (R) lateral shift with (R) RK x 10 reps x 2-3x/day     Supine: (R) Flex-rotate with knees higher up and with self OP x 10 reps x 10 cts ea x 1-2x/day    (R) Shoulder repeated IR with mat/table OP x 10 reps x 1-2x/day    (R) Shoulder IR eccentric strengthening with blackTB and double blackTB 2 x 10 reps x 10 eccen ct ea x 1-2x/day        HEP  Supine: (R) Flex-rotate with knees higher up and with self OP x 10 reps x 10 cts ea x 1-2x/day    (R) Shoulder repeated IR with mat/table OP x 10 reps x 1-2x/day    (R) Shoulder IR eccentric strengthening with blackTB and double blackTB 2 x 10 reps x 10 eccen ct ea x 1-2x/day    Charges     TherEx x 3

## 2025-05-06 NOTE — TELEPHONE ENCOUNTER
Refill request is for a maintenance medication and has met the criteria specified in the Ambulatory Medication Refill Standing Order for eligibility, visits, laboratory, alerts and was sent to the requested pharmacy.    Requested Prescriptions     Signed Prescriptions Disp Refills    escitalopram 10 MG Oral Tab 90 tablet 3     Sig: TAKE ONE TABLET BY MOUTH ONE TIME DAILY     Authorizing Provider: D'AMICO, JEFF ANTHONY     Ordering User: DEQUAN JOAQUIN

## 2025-05-13 ENCOUNTER — OFFICE VISIT (OUTPATIENT)
Dept: PHYSICAL THERAPY | Age: 70
End: 2025-05-13
Attending: STUDENT IN AN ORGANIZED HEALTH CARE EDUCATION/TRAINING PROGRAM
Payer: MEDICARE

## 2025-05-13 PROCEDURE — 97110 THERAPEUTIC EXERCISES: CPT | Performed by: PHYSICAL THERAPIST

## 2025-05-13 NOTE — PROGRESS NOTES
Patient: Johnnie Madden (69 year old, male) Referring Provider:  Insurance:   Diagnosis:   No ref. provider found  [employer] [place] INDEMNITY   Date of Surgery: No data recorded Next MD visit:  MEDICARE   Precautions:  None No data recorded Referral Information:    Date of Evaluation: Req: 0, Auth: 0, Exp:     No data recorded POC Auth Visits:  12       Today's Date   5/13/2025    Subjective  Johnnie state that he feels the leg \"sciatic\" symptom when he squat down to while doing his (R) UE IR with overpressure DP exercise.  He thinks that he kylie forward too much when he squat down.       Pain: 0/10     Objective  Pre-test symptoms: 0/10 (R) lower back and (R) Shoulder:  Lumbar mobility:  Flexion; Nil loss; NE,  Extension: Nil loss; NE,  (R/L) SG: Nil loss; NE               (R) Shoulder: Flexion: Nil loss; NE,  Abduction: Nil loss; NE, (R) Shoulder resisted ER and abudction: P, NW (R) shoulder ache            Assessment  Johnnie continue to respond to (R) shoulder IR DP exercise but was instructed to reach across his back before doing IR stretch.  Added (R) LE dural nerve stretch to her HEP.  All questions were answered at this time.    Goals (to be met in 12 visits)   1. Patient to consistently perform their HEP and it's progression to maintain their improved condition.  2. Patient to have reduced and abolished symptoms to to be able to walk, climb up/down stairs, wake up in the morning, exercise his legs without producing or increasing symptoms in the (R) dorsum of foot.   3. Patient to have WNL of (R) Ankle AROM in all directions with 4-5/5 MMT in all motions to promote all home, work, recreational, and functional activities without discomfort or deviation.       Plan  Re-assess next session and continue with directional preference exercise for the (R) Ankle and back, strengthening (eccentric/concentric), posture correction, postural strengthening, patient education, and HEP progression.    Treatment Last 4 Visits  Treatment  Day: 3       3/25/2025 3/28/2025   LE Treatment   Therapeutic Exercise TM: Retro walk @ 5% grade x 1.0 mph x 10 mins; NE    LIT over rail x 10 reps; NE    Seated: (R) Ankle DF mobs with towel OP x 10 reps; NE     + eccentric strengthening with blackTB 10 reps x 10 eccen ct ea; NE     Step standing leaning on wall with (R) foot/ankle on 4 inch step x 10 reps; NE    Monster walk fwd/bkwd with greenTB abd resist 3 laps x 30 feet; NE tired    Hydrant (R/L) LE with 1 kg ball 8 reps x 10 cts ea; NE tired    (B) UE n.row greenTB x 10 reps x 10 cts ea; NE tired    (B) UE extension, w.row greenTB 5 reps x 10 cts ea; NE tired    LIT over rail x 10 reps; NE TM: Retro walk @ 15% grade x 1.2 mph x 10 mins; NE    LIT over rail x 10 reps; NE    Seated: (R) Ankle DF mobs with towel OP x 10 reps; NE    Standing: (R) Ankle DF mobs on chair x 10 reps; NE    Seated: (R) Ankle eccentric DF with blackTB x 10 reps x 10 eccen ct ea; NE     Step standing leaning on wall x 10 reps; NE    Monster walk fwd/bkwd  with redTB 3 laps x 30 feet; NE tired    (R) LE SLStance with 2-1 lb medial reach tap onto a foam roll 2 x 10 reps; NE tired    Standing: (R) Ankle DF mobs on chair (partial weight bearing) x 10 reps; NE   Therapeutic Exercise Minutes 41 46   Total Time Of Timed Procedures 41 46   Total Time Of Service-Based Procedures 0 0   Total Treatment Time 42 47   HEP LIT over rail x 10 reps x 1-2x/day  Seated: (R) Ankle DF mobs with towel OP x 10 reps x 1-2x/day  + eccentric strengthening with blackTB 10 reps x 10 eccen ct ea x 1-2x/day  LIT over rail x 10 reps x 1-2x/day  Standing: (R) Ankle DF mobs on chair x 10 reps x 1-2x/day  Seated: (R) Ankle eccentric DF with blackTB x 10 reps x 10 eccen ct ea x 1-2x/day             HEP  LIT over rail x 10 reps x 1-2x/day  Standing: (R) Ankle DF mobs on chair x 10 reps x 1-2x/day  Seated: (R) Ankle eccentric DF with blackTB x 10 reps x 10 eccen ct ea x 1-2x/day         Charges     TherEx x  3                                               Plan  Re-assess next sessi and continue to progress directional preference exercise for the (R) Ankle and back, strengthening (eccentric/concentric), posture correction, postural strengthening, patient education, and HEP progression.    Treatment Last 4 Visits  Treatment Day: 10       4/15/2025 4/29/2025 5/13/2025   Spine Treatment   Therapeutic Exercise TM: Retro walk @ 15% grade x 1.2 mph x 10 mins; NE postural training    (R) SGIS x 10 reps; NE    (R) Shoulder repeated IR with mat/table OP  2 x 10 reps; P, NW     (R) Shoulder IR eccentric load with blackTB 2 x 10 reps x 10 eccen ct ea; NE tired    - minimal to no pain/ache (R) shoulder with resisted ER and abduction,  and endrange flexion, abduction, ER    (B) UE OH wall walk with greenTB abduction resist 10 reps x 10 cts ea; NE tired    (R/L) UE D1D2 flexion/extension blueTB x 20 reps; NE     (R) Shoulder IR with mat/table x 10 reps; P, NW     (R) SGIS x 10 reps; NE     TM: Retro walk @ 10% grade x 1.0 mph x 10 mins; NE postural training    Supine: Lumbar (R) flexion rotation with self OP 10 reps x 5 cts ea; P, NW (R) buttock and (R) posterior thigh (better)    (R) Shoulder repeated IR with mat/table OP x 10 reps; P, NW stretch/ache    (R) Shoulder eccentric load with blackTB 2 x 10 reps x 10 eccen ct ea; NE tired    (B) UE rhytmic-stab extension greenTB 10 sets x 10 bounces ea; NE tired    Step up forward/straddle step onto a 6 inch step with a greenTB abduction resist (R/L) LE lead x 20 reps ea; NE tired    (B) UE OH wall walk with redTB abduction resist 10 reps x 10 cts ea; NE tired    Supine: Lumbar flex-rotate (R) with self OP 10 reps x 5 cts ea; NE  TM: Retro walk @ 15% grade x 1.2 mph x 10 mins; NE postural training    (R) UE repeated IR with belt OP x 10 reps; NE    (R) UE IR eccentric strengthening blackTB  2 x 10 reps x 10 eccen ct ea; NE     Supine lumbar spine (R) flex-rotate x 10 reps x 10 cts ea; NE  back; P, W ache (L) shoulder    (R) Shoulder repeated IR with belt OP x 10 reps; Dec, A    Seated: (R) LE dural nerve stretch 5 reps x 8 cts ea; P, NW (R) posterior knee    (R) flex-rotate x 5 reps x 10 cts ea; NE    (R) UE ER/IR greenTB x 20 reps; NE tired    (R) UE eccentric OH flexion/abduction blueTB 18-8 reps x 10 x 10 eccen ct ea; NE tired    (R) UE repeated IR with belt OP x 10 reps; NE tired            Therapeutic Exercise Minutes 54 54 50   Total Time Of Timed Procedures 54 54 50   Total Time Of Service-Based Procedures 0 0 0   Total Treatment Time 56 55 51   HEP (R) Shoulder IR with mat/table x 10 reps x 1-2x/day    (R) Shoulder IR eccentric load with blackTB x 10 reps x 10 eccen x 1-2x/day    (R) SGIS x 10 reps x 1-2x/day   Supine: Lumbar flex-rotate (R) with self OP 10 reps x 5 cts ea x 2-3x/day    (R) Shoulder repeated IR with mat/table OP x 10 reps x 1-2x/day    (R) Shoulder eccentric load with blackTB 2 x 10 reps x 10 eccen ct ea x 1-2x/day  (R) UE repeated IR with belt OP x 10 reps x 2-3x/day    (R) UE IR eccentric strengthening blackTB x 10 reps x 10 eccen ct ea    Supine: (R) lumbar spine flex-rotate x 10 reps x 10 cts ea x 1-2x/day    Seated: (R) LE dural nerve stretch x 5 reps x 8 cts x 1-2x/day        HEP  (R) UE repeated IR with belt OP x 10 reps x 2-3x/day    (R) UE IR eccentric strengthening blackTB x 10 reps x 10 eccen ct ea    Supine: (R) lumbar spine flex-rotate x 10 reps x 10 cts ea x 1-2x/day    Seated: (R) LE dural nerve stretch x 5 reps x 8 cts x 1-2x/day    Charges  TherEx x 3

## 2025-05-16 ENCOUNTER — OFFICE VISIT (OUTPATIENT)
Dept: PHYSICAL THERAPY | Age: 70
End: 2025-05-16
Attending: STUDENT IN AN ORGANIZED HEALTH CARE EDUCATION/TRAINING PROGRAM
Payer: MEDICARE

## 2025-05-16 PROCEDURE — 97110 THERAPEUTIC EXERCISES: CPT | Performed by: PHYSICAL THERAPIST

## 2025-05-16 NOTE — PROGRESS NOTES
Patient: Johnnie Madden (69 year old, male) Referring Provider:  Insurance:   Diagnosis: Mechanical low back pain (M54.59)  Lumbar foraminal stenosis (M48.061)  Lumbar spondylosis (M47.816)  Trigger point of neck (M54.2)  Chronic right shoulder pain (M25.511,G89.29)  Tendinopathy of rotator cuff, unspecified laterality (M67.919) (New Rx 4/1/2025) No ref. provider found  [place]   Date of Surgery: No data recorded Next MD visit:  MEDICARE   Precautions:  None No data recorded Referral Information:    Date of Evaluation: Req: 0, Auth: 0, Exp:     No data recorded POC Auth Visits:  12       Today's Date   5/16/2025    Subjective  Johnnie report that his (R) Shoulder feels better upon waking up this moring.  He said it felt \"neutral\" and did not feel anything.  He has been doing his (R) Shoulder repeated IR with a belt OP and a cable column at work for eccentric strengthening using 17.5 lbs.  His back is also feeling better, were he can lift objects now without producing symptoms in the (R) leg.       Pain: 0/10     Objective  Pre-test symptoms: 0/10 (R) lower back and (R) Shoulder:  Lumbar mobility:  Flexion; Nil loss; NE,  Extension: Nil loss; NE,  (R/L) SG: Nil loss; NE               (R) Shoulder: Flexion: Nil loss; NE,  Abduction: Nil loss; NE, (R) Shoulder resisted ER and abudction; NE       Assessment  Johnnie has WNL of (R) Shoulder AROM with 4-5/5 MMT in all motions.  He also did not report any pain/ache in the (R) lower back and leg.  He continue to progress with UE strengthening and stability exercises.  All questions were answered at this time.    Goals (to be met in 12 visits)   1. Patient to consistently perform their HEP and it's progression to maintain their improved condition.  2. Patient to have reduced and abolished symptoms to to be able to walk, climb up/down stairs, wake up in the morning, exercise his legs without producing or increasing symptoms in the (R) dorsum of foot.   3. Patient to have  WNL of (R) Ankle AROM in all directions with 4-5/5 MMT in all motions to promote all home, work, recreational, and functional activities without discomfort or deviation.       Plan  Re-assess next session and continue with directional preference exercise for the (R) Ankle and back, strengthening (eccentric/concentric), posture correction, postural strengthening, patient education, and HEP progression.    Treatment Last 4 Visits  Treatment Day: 3       3/25/2025 3/28/2025   LE Treatment   Therapeutic Exercise TM: Retro walk @ 5% grade x 1.0 mph x 10 mins; NE    LIT over rail x 10 reps; NE    Seated: (R) Ankle DF mobs with towel OP x 10 reps; NE     + eccentric strengthening with blackTB 10 reps x 10 eccen ct ea; NE     Step standing leaning on wall with (R) foot/ankle on 4 inch step x 10 reps; NE    Monster walk fwd/bkwd with greenTB abd resist 3 laps x 30 feet; NE tired    Hydrant (R/L) LE with 1 kg ball 8 reps x 10 cts ea; NE tired    (B) UE n.row greenTB x 10 reps x 10 cts ea; NE tired    (B) UE extension, w.row greenTB 5 reps x 10 cts ea; NE tired    LIT over rail x 10 reps; NE TM: Retro walk @ 15% grade x 1.2 mph x 10 mins; NE    LIT over rail x 10 reps; NE    Seated: (R) Ankle DF mobs with towel OP x 10 reps; NE    Standing: (R) Ankle DF mobs on chair x 10 reps; NE    Seated: (R) Ankle eccentric DF with blackTB x 10 reps x 10 eccen ct ea; NE     Step standing leaning on wall x 10 reps; NE    Monster walk fwd/bkwd  with redTB 3 laps x 30 feet; NE tired    (R) LE SLStance with 2-1 lb medial reach tap onto a foam roll 2 x 10 reps; NE tired    Standing: (R) Ankle DF mobs on chair (partial weight bearing) x 10 reps; NE   Therapeutic Exercise Minutes 41 46   Total Time Of Timed Procedures 41 46   Total Time Of Service-Based Procedures 0 0   Total Treatment Time 42 47   HEP LIT over rail x 10 reps x 1-2x/day  Seated: (R) Ankle DF mobs with towel OP x 10 reps x 1-2x/day  + eccentric strengthening with blackTB 10  reps x 10 eccen ct ea x 1-2x/day  LIT over rail x 10 reps x 1-2x/day  Standing: (R) Ankle DF mobs on chair x 10 reps x 1-2x/day  Seated: (R) Ankle eccentric DF with blackTB x 10 reps x 10 eccen ct ea x 1-2x/day             HEP  LIT over rail x 10 reps x 1-2x/day  Standing: (R) Ankle DF mobs on chair x 10 reps x 1-2x/day  Seated: (R) Ankle eccentric DF with blackTB x 10 reps x 10 eccen ct ea x 1-2x/day         Charges     TherEx x 3                                                   Plan  Re-assess next sessi and continue to progress directional preference exercise for the (R) Ankle and back, strengthening (eccentric/concentric), posture correction, postural strengthening, patient education, and HEP progression.    Treatment Last 4 Visits  Treatment Day: 11       4/15/2025 4/29/2025 5/13/2025 5/16/2025   Spine Treatment   Therapeutic Exercise TM: Retro walk @ 15% grade x 1.2 mph x 10 mins; NE postural training    (R) SGIS x 10 reps; NE    (R) Shoulder repeated IR with mat/table OP  2 x 10 reps; P, NW     (R) Shoulder IR eccentric load with blackTB 2 x 10 reps x 10 eccen ct ea; NE tired    - minimal to no pain/ache (R) shoulder with resisted ER and abduction,  and endrange flexion, abduction, ER    (B) UE OH wall walk with greenTB abduction resist 10 reps x 10 cts ea; NE tired    (R/L) UE D1D2 flexion/extension blueTB x 20 reps; NE     (R) Shoulder IR with mat/table x 10 reps; P, NW     (R) SGIS x 10 reps; NE     TM: Retro walk @ 10% grade x 1.0 mph x 10 mins; NE postural training    Supine: Lumbar (R) flexion rotation with self OP 10 reps x 5 cts ea; P, NW (R) buttock and (R) posterior thigh (better)    (R) Shoulder repeated IR with mat/table OP x 10 reps; P, NW stretch/ache    (R) Shoulder eccentric load with blackTB 2 x 10 reps x 10 eccen ct ea; NE tired    (B) UE rhytmic-stab extension greenTB 10 sets x 10 bounces ea; NE tired    Step up forward/straddle step onto a 6 inch step with a greenTB abduction resist  (R/L) LE lead x 20 reps ea; NE tired    (B) UE OH wall walk with redTB abduction resist 10 reps x 10 cts ea; NE tired    Supine: Lumbar flex-rotate (R) with self OP 10 reps x 5 cts ea; NE  TM: Retro walk @ 15% grade x 1.2 mph x 10 mins; NE postural training    (R) UE repeated IR with belt OP x 10 reps; NE    (R) UE IR eccentric strengthening blackTB  2 x 10 reps x 10 eccen ct ea; NE     Supine lumbar spine (R) flex-rotate x 10 reps x 10 cts ea; NE back; P, W ache (L) shoulder    (R) Shoulder repeated IR with belt OP x 10 reps; Dec, A    Seated: (R) LE dural nerve stretch 5 reps x 8 cts ea; P, NW (R) posterior knee    (R) flex-rotate x 5 reps x 10 cts ea; NE    (R) UE ER/IR greenTB x 20 reps; NE tired    (R) UE eccentric OH flexion/abduction blueTB 18-8 reps x 10 x 10 eccen ct ea; NE tired    (R) UE repeated IR with belt OP x 10 reps; NE tired          TM: Retro walk @ 15% grade x 1.1 mph x 10 mins; NE postural training    (R) Shoulder repeated IR with belt OP x 10 reps; NE stretch/ache    + eccentric load with double blackTB 2 x 10 reps x 10 eccen ct ea; NE tired burn    (B) LE step up/straddle step onto a 6 inch step with greenTB abduction resist (R/L) LE lead x 20 reps ea; NE     (B) UE OH wall walk with Alt LE lift with greenTB resist x 10 reps x 5 lift ea; NE tired    Supine: (R) Flex-rotate 10 reps x 10 cts ea; NE   Therapeutic Exercise Minutes 54 54 50 45   Total Time Of Timed Procedures 54 54 50 45   Total Time Of Service-Based Procedures 0 0 0 0   Total Treatment Time 56 55 51 46   HEP (R) Shoulder IR with mat/table x 10 reps x 1-2x/day    (R) Shoulder IR eccentric load with blackTB x 10 reps x 10 eccen x 1-2x/day    (R) SGIS x 10 reps x 1-2x/day   Supine: Lumbar flex-rotate (R) with self OP 10 reps x 5 cts ea x 2-3x/day    (R) Shoulder repeated IR with mat/table OP x 10 reps x 1-2x/day    (R) Shoulder eccentric load with blackTB 2 x 10 reps x 10 eccen ct ea x 1-2x/day  (R) UE repeated IR with belt OP x 10  reps x 2-3x/day    (R) UE IR eccentric strengthening blackTB x 10 reps x 10 eccen ct ea    Supine: (R) lumbar spine flex-rotate x 10 reps x 10 cts ea x 1-2x/day    Seated: (R) LE dural nerve stretch x 5 reps x 8 cts x 1-2x/day (R) Shoulder repeated IR with belt OP x 10 reps x 1-2x/day    + eccentric load with double blackTB 2 x 10 reps x 10 eccen ct ea x 1-2x/day    Supine: Lumbar (R) flex-rotate x 10 reps x 10 cts x 1-2x/day        HEP  (R) Shoulder repeated IR with belt OP x 10 reps x 1-2x/day    + eccentric load with double blackTB 2 x 10 reps x 10 eccen ct ea x 1-2x/day    Supine: Lumbar (R) flex-rotate x 10 reps x 10 cts x 1-2x/day    Charges  TherEx x 3

## 2025-06-03 ENCOUNTER — OFFICE VISIT (OUTPATIENT)
Dept: PHYSICAL THERAPY | Age: 70
End: 2025-06-03
Attending: STUDENT IN AN ORGANIZED HEALTH CARE EDUCATION/TRAINING PROGRAM
Payer: MEDICARE

## 2025-06-03 PROCEDURE — 97110 THERAPEUTIC EXERCISES: CPT | Performed by: PHYSICAL THERAPIST

## 2025-06-03 NOTE — PROGRESS NOTES
Patient: Johnnie Madden (69 year old, male) Referring Provider:  Insurance:   Diagnosis: Mechanical low back pain (M54.59)  Lumbar foraminal stenosis (M48.061)  Lumbar spondylosis (M47.816)  Trigger point of neck (M54.2)  Chronic right shoulder pain (M25.511,G89.29)  Tendinopathy of rotator cuff, unspecified laterality (M67.919) (New Rx 4/1/2025) Behar, A. MEDICARE   Date of Surgery: No data recorded Next MD visit:  [place]   Precautions:  None No data recorded Referral Information:    Date of Evaluation: Req: 0, Auth: 0, Exp:     No data recorded POC Auth Visits:  12       Today's Date   6/3/2025    Subjective  Johnnie report that he is feeling good in the (R) Shoulder, back and (R) Ankle.  He has been doing his HEP regularly.  He would like to review exercise she need to maintain and exercises he can do in the gym.       Pain: 0/10     Objective  Pre-test symptoms: 0/10 (R) lower back and (R) Shoulder:  Lumbar mobility:  Flexion; Nil loss; NE,  Extension: Nil loss; NE,  (R/L) SG: Nil loss; NE               (R) Shoulder: Flexion: Nil loss; NE,  Abduction: Nil loss; NE, (R) Shoulder resisted ER and abudction; NE        Assessment  Johnnie has attended 12 physical therapy sessions from 2/18/2025 to 6/3/2025.  He has no symptoms in the back, (R) shoulder, and (R) ankle.  He has been doing his HEP regularly everyday and he has been going to the gym to workout.  He remained well motivated and compliant with his HEP and posture correction using a lumbar roll when sitting and driving.  He was reviewed all his maintenance exercises.  He understood and agreed with his HEP.  All questions wer answered at this time.    Goals (to be met in 12 visits)   1. Patient to consistently perform their HEP and it's progression to maintain their improved condition.  2. Patient to have reduced and abolished symptoms to to be able to walk, climb up/down stairs, wake up in the morning, exercise his legs without producing or increasing  symptoms in the (R) dorsum of foot.   3. Patient to have WNL of (R) Ankle AROM in all directions with 4-5/5 MMT in all motions to promote all home, work, recreational, and functional activities without discomfort or deviation.       Plan  Re-assess next session and continue with directional preference exercise for the (R) Ankle and back, strengthening (eccentric/concentric), posture correction, postural strengthening, patient education, and HEP progression.    Treatment Last 4 Visits  Treatment Day: 3       3/25/2025 3/28/2025   LE Treatment   Therapeutic Exercise TM: Retro walk @ 5% grade x 1.0 mph x 10 mins; NE    LIT over rail x 10 reps; NE    Seated: (R) Ankle DF mobs with towel OP x 10 reps; NE     + eccentric strengthening with blackTB 10 reps x 10 eccen ct ea; NE     Step standing leaning on wall with (R) foot/ankle on 4 inch step x 10 reps; NE    Monster walk fwd/bkwd with greenTB abd resist 3 laps x 30 feet; NE tired    Hydrant (R/L) LE with 1 kg ball 8 reps x 10 cts ea; NE tired    (B) UE n.row greenTB x 10 reps x 10 cts ea; NE tired    (B) UE extension, w.row greenTB 5 reps x 10 cts ea; NE tired    LIT over rail x 10 reps; NE TM: Retro walk @ 15% grade x 1.2 mph x 10 mins; NE    LIT over rail x 10 reps; NE    Seated: (R) Ankle DF mobs with towel OP x 10 reps; NE    Standing: (R) Ankle DF mobs on chair x 10 reps; NE    Seated: (R) Ankle eccentric DF with blackTB x 10 reps x 10 eccen ct ea; NE     Step standing leaning on wall x 10 reps; NE    Monster walk fwd/bkwd  with redTB 3 laps x 30 feet; NE tired    (R) LE SLStance with 2-1 lb medial reach tap onto a foam roll 2 x 10 reps; NE tired    Standing: (R) Ankle DF mobs on chair (partial weight bearing) x 10 reps; NE   Therapeutic Exercise Minutes 41 46   Total Time Of Timed Procedures 41 46   Total Time Of Service-Based Procedures 0 0   Total Treatment Time 42 47   HEP LIT over rail x 10 reps x 1-2x/day  Seated: (R) Ankle DF mobs with towel OP x 10 reps  x 1-2x/day  + eccentric strengthening with blackTB 10 reps x 10 eccen ct ea x 1-2x/day  LIT over rail x 10 reps x 1-2x/day  Standing: (R) Ankle DF mobs on chair x 10 reps x 1-2x/day  Seated: (R) Ankle eccentric DF with blackTB x 10 reps x 10 eccen ct ea x 1-2x/day             HEP  LIT over rail x 10 reps x 1-2x/day  Standing: (R) Ankle DF mobs on chair x 10 reps x 1-2x/day  Seated: (R) Ankle eccentric DF with blackTB x 10 reps x 10 eccen ct ea x 1-2x/day         Charges     TherEx x 3                                                       Plan  Discharge from physical therapy with HEP.    Treatment Last 4 Visits  Treatment Day: 12       4/29/2025 5/13/2025 5/16/2025 6/3/2025   Spine Treatment   Therapeutic Exercise TM: Retro walk @ 10% grade x 1.0 mph x 10 mins; NE postural training    Supine: Lumbar (R) flexion rotation with self OP 10 reps x 5 cts ea; P, NW (R) buttock and (R) posterior thigh (better)    (R) Shoulder repeated IR with mat/table OP x 10 reps; P, NW stretch/ache    (R) Shoulder eccentric load with blackTB 2 x 10 reps x 10 eccen ct ea; NE tired    (B) UE rhytmic-stab extension greenTB 10 sets x 10 bounces ea; NE tired    Step up forward/straddle step onto a 6 inch step with a greenTB abduction resist (R/L) LE lead x 20 reps ea; NE tired    (B) UE OH wall walk with redTB abduction resist 10 reps x 10 cts ea; NE tired    Supine: Lumbar flex-rotate (R) with self OP 10 reps x 5 cts ea; NE  TM: Retro walk @ 15% grade x 1.2 mph x 10 mins; NE postural training    (R) UE repeated IR with belt OP x 10 reps; NE    (R) UE IR eccentric strengthening blackTB  2 x 10 reps x 10 eccen ct ea; NE     Supine lumbar spine (R) flex-rotate x 10 reps x 10 cts ea; NE back; P, W ache (L) shoulder    (R) Shoulder repeated IR with belt OP x 10 reps; Dec, A    Seated: (R) LE dural nerve stretch 5 reps x 8 cts ea; P, NW (R) posterior knee    (R) flex-rotate x 5 reps x 10 cts ea; NE    (R) UE ER/IR greenTB x 20 reps; NE  tired    (R) UE eccentric OH flexion/abduction blueTB 18-8 reps x 10 x 10 eccen ct ea; NE tired    (R) UE repeated IR with belt OP x 10 reps; NE tired          TM: Retro walk @ 15% grade x 1.1 mph x 10 mins; NE postural training    (R) Shoulder repeated IR with belt OP x 10 reps; NE stretch/ache    + eccentric load with double blackTB 2 x 10 reps x 10 eccen ct ea; NE tired burn    (B) LE step up/straddle step onto a 6 inch step with greenTB abduction resist (R/L) LE lead x 20 reps ea; NE     (B) UE OH wall walk with Alt LE lift with greenTB resist x 10 reps x 5 lift ea; NE tired    Supine: (R) Flex-rotate 10 reps x 10 cts ea; NE TM: Retro walk @ 15% grade x 1.1 mph x 10 mins; NE postural training    Supine: Lumbar spine (R) flexion-rotation x 10 reps x 10 cts ea; NE     (R) Shoulder repeated IR with belt OP x 10 reps; NE stretch/ache     + eccentric load with double blackTB 2 x 10 reps x 10 eccen ct ea; NE tired burn    Inverted bosu squat x 10 reps x 10 cts ea; NE      (B) LE step up/straddle step onto a 6 inch step with greenTB abduction resist (R/L) LE lead x 20 reps ea; NE      (B) UE OH wall walk with Alt LE lift with greenTB resist x 5 reps x 5 lift ea; NE tired/fatigued        Therapeutic Exercise Minutes 54 50 45 47   Total Time Of Timed Procedures 54 50 45 47   Total Time Of Service-Based Procedures 0 0 0 0   Total Treatment Time 55 51 46 48   HEP Supine: Lumbar flex-rotate (R) with self OP 10 reps x 5 cts ea x 2-3x/day    (R) Shoulder repeated IR with mat/table OP x 10 reps x 1-2x/day    (R) Shoulder eccentric load with blackTB 2 x 10 reps x 10 eccen ct ea x 1-2x/day  (R) UE repeated IR with belt OP x 10 reps x 2-3x/day    (R) UE IR eccentric strengthening blackTB x 10 reps x 10 eccen ct ea    Supine: (R) lumbar spine flex-rotate x 10 reps x 10 cts ea x 1-2x/day    Seated: (R) LE dural nerve stretch x 5 reps x 8 cts x 1-2x/day (R) Shoulder repeated IR with belt OP x 10 reps x 1-2x/day    + eccentric load  with double blackTB 2 x 10 reps x 10 eccen ct ea x 1-2x/day    Supine: Lumbar (R) flex-rotate x 10 reps x 10 cts x 1-2x/day Lumbar spine:  Supine (R) flexion-rotation x 10 reps x 10 cts x 2-3x/day    (R) Shoulder repeated IR with self OP (using a strap) x 10 reps x 1-2x/day    + eccentric strengthening with double blackTB x 10 reps x 10 eccentric ct each x 1-2x/day    (R) Ankle DF mobs x 10 reps x 1x/day        HEP  Lumbar spine:  Supine (R) flexion-rotation x 10 reps x 10 cts x 2-3x/day    (R) Shoulder repeated IR with self OP (using a strap) x 10 reps x 1-2x/day    + eccentric strengthening with double blackTB x 10 reps x 10 eccentric ct each x 1-2x/day    (R) Ankle DF mobs x 10 reps x 1x/day    Charges  TherEx x 3

## 2025-06-09 ENCOUNTER — HOSPITAL ENCOUNTER (OUTPATIENT)
Dept: GENERAL RADIOLOGY | Age: 70
Discharge: HOME | End: 2025-06-09
Attending: PHYSICAL MEDICINE & REHABILITATION
Payer: MEDICARE

## 2025-06-09 DIAGNOSIS — G89.29 CHRONIC RIGHT SHOULDER PAIN: ICD-10-CM

## 2025-06-09 DIAGNOSIS — M99.9 BIOMECHANICAL LESION: ICD-10-CM

## 2025-06-09 DIAGNOSIS — M79.10 MYALGIA: ICD-10-CM

## 2025-06-09 DIAGNOSIS — M54.59 MECHANICAL LOW BACK PAIN: ICD-10-CM

## 2025-06-09 DIAGNOSIS — M25.511 CHRONIC RIGHT SHOULDER PAIN: ICD-10-CM

## 2025-06-09 DIAGNOSIS — M47.816 LUMBAR SPONDYLOSIS: ICD-10-CM

## 2025-06-09 DIAGNOSIS — M54.2 TRIGGER POINT OF NECK: ICD-10-CM

## 2025-06-09 DIAGNOSIS — M48.061 LUMBAR FORAMINAL STENOSIS: ICD-10-CM

## 2025-06-09 DIAGNOSIS — M67.919 TENDINOPATHY OF ROTATOR CUFF, UNSPECIFIED LATERALITY: ICD-10-CM

## 2025-06-09 PROCEDURE — 73030 X-RAY EXAM OF SHOULDER: CPT | Performed by: PHYSICAL MEDICINE & REHABILITATION

## 2025-06-19 ENCOUNTER — TELEPHONE (OUTPATIENT)
Dept: INTERNAL MEDICINE CLINIC | Facility: CLINIC | Age: 70
End: 2025-06-19

## 2025-06-19 DIAGNOSIS — F41.9 ANXIETY: ICD-10-CM

## 2025-06-19 RX ORDER — ALPRAZOLAM 0.25 MG
0.25 TABLET ORAL EVERY 6 HOURS PRN
Qty: 40 TABLET | Refills: 1 | Status: SHIPPED | OUTPATIENT
Start: 2025-06-19

## 2025-06-19 NOTE — TELEPHONE ENCOUNTER
Patient called  No refills available for Alprazolam   Patient requests refill before office closes and he can re establish care  Pharmacy - [facility]

## 2025-06-19 NOTE — TELEPHONE ENCOUNTER
To MD:  The above refill request is for a controlled substance.  Please review pended medication order.   Print and sign for staff to fax to pharmacy or prescribe electronically.    Last office visit: 10/8/2024  Last time refill sent and quantity/refills: 2/25/2025 #40 with 1 refill

## 2025-08-05 ENCOUNTER — OFFICE VISIT (OUTPATIENT)
Dept: PHYSICAL MEDICINE AND REHAB | Facility: CLINIC | Age: 70
End: 2025-08-05

## 2025-08-05 VITALS — BODY MASS INDEX: 27.64 KG/M2 | WEIGHT: 172 LBS | HEIGHT: 66 IN

## 2025-08-05 DIAGNOSIS — G89.29 CHRONIC RIGHT SHOULDER PAIN: ICD-10-CM

## 2025-08-05 DIAGNOSIS — M47.816 FACET SYNDROME, LUMBAR: ICD-10-CM

## 2025-08-05 DIAGNOSIS — M54.2 TRIGGER POINT OF NECK: ICD-10-CM

## 2025-08-05 DIAGNOSIS — M79.10 MYALGIA: ICD-10-CM

## 2025-08-05 DIAGNOSIS — M54.16 LUMBAR RADICULOPATHY: ICD-10-CM

## 2025-08-05 DIAGNOSIS — M99.9 BIOMECHANICAL LESION: ICD-10-CM

## 2025-08-05 DIAGNOSIS — M47.816 LUMBAR SPONDYLOSIS: ICD-10-CM

## 2025-08-05 DIAGNOSIS — M54.59 MECHANICAL LOW BACK PAIN: Primary | ICD-10-CM

## 2025-08-05 DIAGNOSIS — M25.511 CHRONIC RIGHT SHOULDER PAIN: ICD-10-CM

## 2025-08-05 DIAGNOSIS — M67.919 TENDINOPATHY OF ROTATOR CUFF, UNSPECIFIED LATERALITY: ICD-10-CM

## 2025-08-05 DIAGNOSIS — M48.061 LUMBAR FORAMINAL STENOSIS: ICD-10-CM

## 2025-08-05 DIAGNOSIS — M51.369 BULGE OF LUMBAR DISC WITHOUT MYELOPATHY: ICD-10-CM

## 2025-08-05 DIAGNOSIS — M51.372 DEGENERATION OF INTERVERTEBRAL DISC OF LUMBOSACRAL REGION WITH DISCOGENIC BACK PAIN AND LOWER EXTREMITY PAIN: ICD-10-CM

## 2025-08-05 PROCEDURE — 99213 OFFICE O/P EST LOW 20 MIN: CPT | Performed by: PHYSICAL MEDICINE & REHABILITATION

## 2025-08-06 DIAGNOSIS — F41.9 ANXIETY: ICD-10-CM

## 2025-08-07 RX ORDER — ESCITALOPRAM OXALATE 10 MG/1
10 TABLET ORAL DAILY
Qty: 90 TABLET | Refills: 0 | Status: SHIPPED | OUTPATIENT
Start: 2025-08-07

## (undated) DIAGNOSIS — F41.9 ANXIETY: ICD-10-CM

## (undated) NOTE — LETTER
Patient Name: Johnnie Madden  YOB: 1955          MRN :  [id number]  Diagnosis:  Lumbar radiculopathy (M54.16)  Mechanical low back pain (M54.59)  Lumbar foraminal stenosis (M48.061)  Lumbar spondylosis (M47.816)  Degeneration of intervertebral disc of lumbosacral region with discogenic back pain and lower extremity pain (M51.372)  Bulge of lumbar disc without myelopathy (M51.369)  Facet syndrome, lumbar (M47.816)  Myalgia (M79.10)   Biomechanical lesion (M99.9)        Referring Provider:  Behar, A. MD Date of Evaluation:  12/10/2024    Precautions:  None Date POC Expires: 3/10/2025     Date of Surgery: NA    Next MD visit:   none scheduled     Today's Date: 2/14/2025  Insurance Primary/Secondary: MEDICARE PART A&B/ [place]   Authorized Visits: 12     Visit Number: 12  Subjective:   Johnnie report that he is feeling good in the back.  There is no pain/ache in the back and he has been doing all his activities with more confidence.  He would like to review the exercises that he will need to continue to maintain his improved condition.    Objective/Assessment:   Johnnie has attended 12 physical therapy sessions from 12/10/2024 to 2/14/2025.  He is symptomfree in the lower back with WNL of AROM in all directions.  He responded to a lumbar spine directional preference exercise toward (R) lateral sideglide with flexion in the unloaded position ((R) flex-rotate in supine).  He was treated with posture correction specally in sitting using a lumbar roll (recommended) and progressed with dynamic postural strengthening/stability exercises and HEP.  He remained well motivated during his therapy sessions and was issued copies of his maintenance exercises.  He feels confident that he will be able to his HEP independently and correctly.  He is now discharged from physical therapy at this time.     DEDRICK Score: Initial:  12; Discharge: 2  Goals: - MET   (12 visits)  1. Patient to consistently perform her HEP and it's  progression to maintain her improved condition.   2. Patient to have reduced, centralized, and abolished symptoms in the low back to enable easier ADLs, functional, work, and recreational activities.   3. Patient to have WNL of lumbar mobility in all directions to be able to climb up stairs, sit, rise up from sitting, and run/jog/fast walk without producing or increasing symptoms in the (R) lower buttock, posterior thigh, knee, shin, and between 1st and 2nd toes.   4. Patient to consistently have good posture to promote her symptomfree condition.   Plan:   Discharge from physical therapy with HEP.                21st Century Cures Act Notice to Patient: Medical documents like this are made available to patients in the interest of transparency. However, be advised this is a medical document and it is intended as [id number] communication between your medical providers. This medical document may contain abbreviations, assessments, medical data, and results or other terms that are unfamiliar. Medical documents are intended to carry relevant information, facts as evident, and the clinical opinion of the practitioner. As such, this medical document may be written in language that appears blunt or direct. You are encouraged to contact your medical provider and/or [facility] Experience if you have any questions about this medical document.

## (undated) NOTE — Clinical Note
Dear Dr. D'Amico, Thank you for the referral and allowing me to participate in the care of Johnnie Madden. Please call me with any questions at [phone]  Alex B. Behar MD, [facility] Rehabilitation/Sports Medicine [facility]

## (undated) NOTE — LETTER
12/18/2024          Johnnie Madden        [address] [address]         Dear Johnnie,    This letter is to inform you that our office has made several attempts to reach you by phone without success.  We were attempting to contact you by phone regarding your message from 11/25/24 in regards to Lexapro .    Please contact our office at the number listed below as soon as you receive this letter to discuss this issue and to make the necessary changes in our system to your contact information.  Thank you for your cooperation.        Sincerely,    Jeff Anthony D'Amico, DO  [facility] [place], [place] [address] [address] [address]  [phone]